# Patient Record
Sex: FEMALE | Race: WHITE | NOT HISPANIC OR LATINO
[De-identification: names, ages, dates, MRNs, and addresses within clinical notes are randomized per-mention and may not be internally consistent; named-entity substitution may affect disease eponyms.]

---

## 2019-11-25 ENCOUNTER — APPOINTMENT (OUTPATIENT)
Dept: OTOLARYNGOLOGY | Facility: CLINIC | Age: 49
End: 2019-11-25
Payer: COMMERCIAL

## 2019-11-25 VITALS
SYSTOLIC BLOOD PRESSURE: 100 MMHG | BODY MASS INDEX: 29.02 KG/M2 | HEART RATE: 79 BPM | DIASTOLIC BLOOD PRESSURE: 69 MMHG | HEIGHT: 64 IN | WEIGHT: 170 LBS

## 2019-11-25 DIAGNOSIS — F17.200 NICOTINE DEPENDENCE, UNSPECIFIED, UNCOMPLICATED: ICD-10-CM

## 2019-11-25 DIAGNOSIS — H93.11 TINNITUS, RIGHT EAR: ICD-10-CM

## 2019-11-25 DIAGNOSIS — H91.91 UNSPECIFIED HEARING LOSS, RIGHT EAR: ICD-10-CM

## 2019-11-25 DIAGNOSIS — H81.03 MENIERE'S DISEASE, BILATERAL: ICD-10-CM

## 2019-11-25 DIAGNOSIS — H90.5 UNSPECIFIED SENSORINEURAL HEARING LOSS: ICD-10-CM

## 2019-11-25 DIAGNOSIS — Z78.9 OTHER SPECIFIED HEALTH STATUS: ICD-10-CM

## 2019-11-25 PROCEDURE — 92584 ELECTROCOCHLEOGRAPHY: CPT

## 2019-11-25 PROCEDURE — 99204 OFFICE O/P NEW MOD 45 MIN: CPT | Mod: 25

## 2019-11-25 PROCEDURE — 92567 TYMPANOMETRY: CPT

## 2019-11-25 PROCEDURE — 92557 COMPREHENSIVE HEARING TEST: CPT

## 2019-11-25 NOTE — REASON FOR VISIT
[Initial Evaluation] : an initial evaluation for [Other: _____] : [unfilled] [FreeTextEntry2] : here for Meniere's disease, right hearing loss, right tinnitus

## 2019-11-25 NOTE — HISTORY OF PRESENT ILLNESS
[de-identified] : 49 year old female here for Menieres disease, right hearing loss, right tinnitus. Wearing right hearing aid.  States diagnosed with Menieres 2015, not sure if it is bilateral or unilateral.  States last episode March 2019, lasting 5 days with nausea and vomiting.  States takes Antivert with some relief.  States right hearing loss diagnosed 2017, constant right tinnitus started 8 years ago.  No family history of hearing loss or Menieres.  Seen in [facility] - watches sodium - offered diuretic if sx got worse - no sig allergies  - no thyroid problems - no AI history in patient or family. Previously saw Joselito Haney. Last has MRI 2017  - normal MRI - done somewhere in [place].

## 2019-12-22 ENCOUNTER — FORM ENCOUNTER (OUTPATIENT)
Age: 49
End: 2019-12-22

## 2019-12-23 ENCOUNTER — APPOINTMENT (OUTPATIENT)
Dept: MRI IMAGING | Facility: IMAGING CENTER | Age: 49
End: 2019-12-23
Payer: COMMERCIAL

## 2019-12-23 ENCOUNTER — OUTPATIENT (OUTPATIENT)
Dept: OUTPATIENT SERVICES | Facility: HOSPITAL | Age: 49
LOS: 1 days | End: 2019-12-23
Payer: COMMERCIAL

## 2019-12-23 DIAGNOSIS — H90.5 UNSPECIFIED SENSORINEURAL HEARING LOSS: ICD-10-CM

## 2019-12-23 PROCEDURE — 70553 MRI BRAIN STEM W/O & W/DYE: CPT | Mod: 26

## 2019-12-23 PROCEDURE — 70553 MRI BRAIN STEM W/O & W/DYE: CPT

## 2019-12-23 PROCEDURE — A9585: CPT

## 2020-01-02 ENCOUNTER — NON-APPOINTMENT (OUTPATIENT)
Age: 50
End: 2020-01-02

## 2020-01-07 ENCOUNTER — TRANSCRIPTION ENCOUNTER (OUTPATIENT)
Age: 50
End: 2020-01-07

## 2020-01-07 ENCOUNTER — INPATIENT (INPATIENT)
Facility: HOSPITAL | Age: 50
LOS: 2 days | Discharge: HOME | End: 2020-01-10
Attending: INTERNAL MEDICINE | Admitting: INTERNAL MEDICINE
Payer: COMMERCIAL

## 2020-01-07 VITALS
RESPIRATION RATE: 18 BRPM | OXYGEN SATURATION: 100 % | DIASTOLIC BLOOD PRESSURE: 81 MMHG | TEMPERATURE: 98 F | HEART RATE: 92 BPM | SYSTOLIC BLOOD PRESSURE: 120 MMHG

## 2020-01-07 LAB
ALBUMIN SERPL ELPH-MCNC: 4.6 G/DL — SIGNIFICANT CHANGE UP (ref 3.3–5)
ALP SERPL-CCNC: 63 U/L — SIGNIFICANT CHANGE UP (ref 40–120)
ALT FLD-CCNC: 18 U/L — SIGNIFICANT CHANGE UP (ref 4–33)
ANION GAP SERPL CALC-SCNC: 16 MMO/L — HIGH (ref 7–14)
AST SERPL-CCNC: 22 U/L — SIGNIFICANT CHANGE UP (ref 4–32)
BASE EXCESS BLDV CALC-SCNC: 2.1 MMOL/L — SIGNIFICANT CHANGE UP
BASOPHILS # BLD AUTO: 0.04 K/UL — SIGNIFICANT CHANGE UP (ref 0–0.2)
BASOPHILS NFR BLD AUTO: 0.5 % — SIGNIFICANT CHANGE UP (ref 0–2)
BILIRUB SERPL-MCNC: 0.4 MG/DL — SIGNIFICANT CHANGE UP (ref 0.2–1.2)
BLOOD GAS VENOUS - CREATININE: 0.75 MG/DL — SIGNIFICANT CHANGE UP (ref 0.5–1.3)
BUN SERPL-MCNC: 9 MG/DL — SIGNIFICANT CHANGE UP (ref 7–23)
CALCIUM SERPL-MCNC: 10.1 MG/DL — SIGNIFICANT CHANGE UP (ref 8.4–10.5)
CHLORIDE BLDV-SCNC: 109 MMOL/L — HIGH (ref 96–108)
CHLORIDE SERPL-SCNC: 102 MMOL/L — SIGNIFICANT CHANGE UP (ref 98–107)
CO2 SERPL-SCNC: 23 MMOL/L — SIGNIFICANT CHANGE UP (ref 22–31)
CREAT SERPL-MCNC: 0.75 MG/DL — SIGNIFICANT CHANGE UP (ref 0.5–1.3)
EOSINOPHIL # BLD AUTO: 0.16 K/UL — SIGNIFICANT CHANGE UP (ref 0–0.5)
EOSINOPHIL NFR BLD AUTO: 1.9 % — SIGNIFICANT CHANGE UP (ref 0–6)
GAS PNL BLDV: 139 MMOL/L — SIGNIFICANT CHANGE UP (ref 136–146)
GLUCOSE BLDV-MCNC: 90 MG/DL — SIGNIFICANT CHANGE UP (ref 70–99)
GLUCOSE SERPL-MCNC: 94 MG/DL — SIGNIFICANT CHANGE UP (ref 70–99)
HCO3 BLDV-SCNC: 24 MMOL/L — SIGNIFICANT CHANGE UP (ref 20–27)
HCT VFR BLD CALC: 40.8 % — SIGNIFICANT CHANGE UP (ref 34.5–45)
HCT VFR BLDV CALC: 42.8 % — SIGNIFICANT CHANGE UP (ref 34.5–45)
HGB BLD-MCNC: 13.4 G/DL — SIGNIFICANT CHANGE UP (ref 11.5–15.5)
HGB BLDV-MCNC: 13.9 G/DL — SIGNIFICANT CHANGE UP (ref 11.5–15.5)
IMM GRANULOCYTES NFR BLD AUTO: 0.2 % — SIGNIFICANT CHANGE UP (ref 0–1.5)
LACTATE BLDV-MCNC: 1.3 MMOL/L — SIGNIFICANT CHANGE UP (ref 0.5–2)
LYMPHOCYTES # BLD AUTO: 1.91 K/UL — SIGNIFICANT CHANGE UP (ref 1–3.3)
LYMPHOCYTES # BLD AUTO: 22.3 % — SIGNIFICANT CHANGE UP (ref 13–44)
MCHC RBC-ENTMCNC: 30.5 PG — SIGNIFICANT CHANGE UP (ref 27–34)
MCHC RBC-ENTMCNC: 32.8 % — SIGNIFICANT CHANGE UP (ref 32–36)
MCV RBC AUTO: 92.7 FL — SIGNIFICANT CHANGE UP (ref 80–100)
MONOCYTES # BLD AUTO: 0.64 K/UL — SIGNIFICANT CHANGE UP (ref 0–0.9)
MONOCYTES NFR BLD AUTO: 7.5 % — SIGNIFICANT CHANGE UP (ref 2–14)
NEUTROPHILS # BLD AUTO: 5.79 K/UL — SIGNIFICANT CHANGE UP (ref 1.8–7.4)
NEUTROPHILS NFR BLD AUTO: 67.6 % — SIGNIFICANT CHANGE UP (ref 43–77)
NRBC # FLD: 0 K/UL — SIGNIFICANT CHANGE UP (ref 0–0)
PCO2 BLDV: 50 MMHG — SIGNIFICANT CHANGE UP (ref 41–51)
PH BLDV: 7.35 PH — SIGNIFICANT CHANGE UP (ref 7.32–7.43)
PLATELET # BLD AUTO: 271 K/UL — SIGNIFICANT CHANGE UP (ref 150–400)
PMV BLD: 9.8 FL — SIGNIFICANT CHANGE UP (ref 7–13)
PO2 BLDV: < 24 MMHG — LOW (ref 35–40)
POTASSIUM BLDV-SCNC: 3.8 MMOL/L — SIGNIFICANT CHANGE UP (ref 3.4–4.5)
POTASSIUM SERPL-MCNC: 4.5 MMOL/L — SIGNIFICANT CHANGE UP (ref 3.5–5.3)
POTASSIUM SERPL-SCNC: 4.5 MMOL/L — SIGNIFICANT CHANGE UP (ref 3.5–5.3)
PROT SERPL-MCNC: 8.5 G/DL — HIGH (ref 6–8.3)
RBC # BLD: 4.4 M/UL — SIGNIFICANT CHANGE UP (ref 3.8–5.2)
RBC # FLD: 11.4 % — SIGNIFICANT CHANGE UP (ref 10.3–14.5)
SAO2 % BLDV: 36.3 % — LOW (ref 60–85)
SODIUM SERPL-SCNC: 141 MMOL/L — SIGNIFICANT CHANGE UP (ref 135–145)
WBC # BLD: 8.56 K/UL — SIGNIFICANT CHANGE UP (ref 3.8–10.5)
WBC # FLD AUTO: 8.56 K/UL — SIGNIFICANT CHANGE UP (ref 3.8–10.5)

## 2020-01-07 PROCEDURE — 70491 CT SOFT TISSUE NECK W/DYE: CPT | Mod: 26

## 2020-01-07 RX ORDER — KETOROLAC TROMETHAMINE 30 MG/ML
15 SYRINGE (ML) INJECTION ONCE
Refills: 0 | Status: DISCONTINUED | OUTPATIENT
Start: 2020-01-07 | End: 2020-01-07

## 2020-01-07 RX ORDER — DEXAMETHASONE 0.5 MG/5ML
10 ELIXIR ORAL ONCE
Refills: 0 | Status: DISCONTINUED | OUTPATIENT
Start: 2020-01-07 | End: 2020-01-07

## 2020-01-07 RX ORDER — SODIUM CHLORIDE 9 MG/ML
1000 INJECTION INTRAMUSCULAR; INTRAVENOUS; SUBCUTANEOUS ONCE
Refills: 0 | Status: COMPLETED | OUTPATIENT
Start: 2020-01-07 | End: 2020-01-07

## 2020-01-07 RX ORDER — AMPICILLIN SODIUM AND SULBACTAM SODIUM 250; 125 MG/ML; MG/ML
3 INJECTION, POWDER, FOR SUSPENSION INTRAMUSCULAR; INTRAVENOUS ONCE
Refills: 0 | Status: DISCONTINUED | OUTPATIENT
Start: 2020-01-07 | End: 2020-01-07

## 2020-01-07 RX ORDER — MEROPENEM 1 G/30ML
1000 INJECTION INTRAVENOUS ONCE
Refills: 0 | Status: COMPLETED | OUTPATIENT
Start: 2020-01-07 | End: 2020-01-07

## 2020-01-07 RX ORDER — DEXAMETHASONE 0.5 MG/5ML
10 ELIXIR ORAL ONCE
Refills: 0 | Status: COMPLETED | OUTPATIENT
Start: 2020-01-07 | End: 2020-01-07

## 2020-01-07 RX ADMIN — Medication 15 MILLIGRAM(S): at 18:00

## 2020-01-07 RX ADMIN — Medication 15 MILLIGRAM(S): at 17:44

## 2020-01-07 RX ADMIN — Medication 15 MILLIGRAM(S): at 23:19

## 2020-01-07 RX ADMIN — SODIUM CHLORIDE 1000 MILLILITER(S): 9 INJECTION INTRAMUSCULAR; INTRAVENOUS; SUBCUTANEOUS at 22:52

## 2020-01-07 RX ADMIN — MEROPENEM 100 MILLIGRAM(S): 1 INJECTION INTRAVENOUS at 23:19

## 2020-01-07 RX ADMIN — Medication 15 MILLIGRAM(S): at 22:33

## 2020-01-07 RX ADMIN — Medication 102 MILLIGRAM(S): at 22:32

## 2020-01-07 RX ADMIN — Medication 100 MILLIGRAM(S): at 18:04

## 2020-01-07 NOTE — ED PROVIDER NOTE - ATTENDING CONTRIBUTION TO CARE
Lloyd: 49 yof with root canal 4 days ago with notable increased swelling and pain since. Pt saw her oral surgeon who recommended ED. Pt is having difficulty opening her mouth but no difficulty breathing or walking. Pt hs been taking Motrin around the clock. On exam, pt is comfortable, no respiratory distress, clear lungs, RRR, abd soft and non tender. Lloyd: 49 yof with root canal 4 days ago with notable increased swelling and pain since. Pt saw her oral surgeon who recommended ED. Pt is having difficulty opening her mouth but no difficulty breathing or walking. Pt hs been taking Motrin around the clock. On exam, pt is comfortable, no respiratory distress, clear lungs, RRR, abd soft and non tender. significant submandibular edema with firm mass on left side with no overlying erythema. Concern for abscess - no airway concern at this time but if edema gets worse it may be a problem. pian meds, labs and CT ordered. Unlikely Peter's at this time.

## 2020-01-07 NOTE — ED PROVIDER NOTE - CLINICAL SUMMARY MEDICAL DECISION MAKING FREE TEXT BOX
left neck and submandibular swelling s/p root canal 5 days ago. mild trismus, no fever or stridor and able to tolerate po and secretion. no active concern for air at this time, concerning for possible erick, will ct neck and labs, abx, pain control.

## 2020-01-07 NOTE — ED PROVIDER NOTE - PROGRESS NOTE DETAILS
Geovany Campo, PGY 2: signout to PA. ZULY UMANA: OMFS consulted. started Unasyn. CT shows small abscess in sublingual space. Will continue to monitor and reassess. ZULY UMANA: OMFS consulted. started meropenam. CT shows small abscess in sublingual space. Will continue to monitor and reassess. ovidio-patient s/o to me by dr. dale-f/u ct results-ct reviewed, omfs consulted-patient switched to meropenam given pcn allergy (rash) and has been on clinda for days without improvement, recommending admission for iv abx, will follow patient. Patient had some relief of pain, returned and redosed pain meds with improvement of pain. Patient protecting airway, no drooling/stridor, able to swallow, speaking clearly. ZULY UMANA: Pt seen by OMFS, agrees with IV abx, plan for I&D once pt is able to open up more tomorrow. Spoke with hospitalist, accepted pt. Pt admitted for sublingual abscess. MAR text paged.

## 2020-01-07 NOTE — ED PROVIDER NOTE - OBJECTIVE STATEMENT
49F, no pmh, p.w left neck swelling and tooth ache s/p root canal 5 days ago. pain is worsening today. Pt went to see her dentist and oral-surgery who referred her to the ED. Pt denied any fever but is taking motrin around the clock for pain control. Denied any shortness of breath, difficulty breathing, numbness of the face or the neck. No immunocompromised. allergy to penicillin (rash) and on clindamycin.

## 2020-01-07 NOTE — ED PROVIDER NOTE - PHYSICAL EXAMINATION
General: NAD, good hygiene, well developed  HENT: Atraumatic, EOMI, Left sided neck swelling with tenderness to the submandibular area, no tongue elevation, mild trismus of 4cm, no stridor and able to hold secretions, no conjunctivae injection, moist mucosa, no post. oropharynx erythema, exudates  Neck: normal ROM and trachea midline   Cardiovascular: RRR, S1&2, no M or R, radial pulses equal and b/l  Respiratory: CTABL, no wheezes or crackles, no decreased breath sounds  Abdominal: soft and non-tender non distended, neg for guarding  Extremities: no edema of the legs/feet   Skin: warm, well perfused  Neurologic: nonfocal, AAOx3  Psych: normal mood and affect

## 2020-01-07 NOTE — ED ADULT TRIAGE NOTE - CHIEF COMPLAINT QUOTE
Pt had lower left root canal on Friday. States she went back to endontist who did root canal because she was having pain and swelling to left lower jaw. Endontist sent patient to oral surgeon. Oral surgeon told patient she needs to go to ED because infection in jaw is deep. Pt had lower left root canal on Friday. States she went back to endontist who did root canal because she was having pain and swelling to left lower jaw. Endontist sent patient to oral surgeon. Oral surgeon told patient she needs to go to ED because infection in jaw is deep. Deep difficulty breathing.

## 2020-01-08 DIAGNOSIS — K12.2 CELLULITIS AND ABSCESS OF MOUTH: ICD-10-CM

## 2020-01-08 DIAGNOSIS — Z02.9 ENCOUNTER FOR ADMINISTRATIVE EXAMINATIONS, UNSPECIFIED: ICD-10-CM

## 2020-01-08 DIAGNOSIS — Z29.9 ENCOUNTER FOR PROPHYLACTIC MEASURES, UNSPECIFIED: ICD-10-CM

## 2020-01-08 LAB
ANION GAP SERPL CALC-SCNC: 12 MMO/L — SIGNIFICANT CHANGE UP (ref 7–14)
APTT BLD: 31.7 SEC — SIGNIFICANT CHANGE UP (ref 27.5–36.3)
BUN SERPL-MCNC: 15 MG/DL — SIGNIFICANT CHANGE UP (ref 7–23)
CALCIUM SERPL-MCNC: 9.2 MG/DL — SIGNIFICANT CHANGE UP (ref 8.4–10.5)
CHLORIDE SERPL-SCNC: 107 MMOL/L — SIGNIFICANT CHANGE UP (ref 98–107)
CO2 SERPL-SCNC: 20 MMOL/L — LOW (ref 22–31)
CREAT SERPL-MCNC: 0.58 MG/DL — SIGNIFICANT CHANGE UP (ref 0.5–1.3)
GLUCOSE SERPL-MCNC: 102 MG/DL — HIGH (ref 70–99)
GRAM STN WND: SIGNIFICANT CHANGE UP
HCG SERPL-ACNC: < 5 MIU/ML — SIGNIFICANT CHANGE UP
HCT VFR BLD CALC: 36 % — SIGNIFICANT CHANGE UP (ref 34.5–45)
HGB BLD-MCNC: 12.3 G/DL — SIGNIFICANT CHANGE UP (ref 11.5–15.5)
INR BLD: 1.18 — HIGH (ref 0.88–1.17)
MAGNESIUM SERPL-MCNC: 2.1 MG/DL — SIGNIFICANT CHANGE UP (ref 1.6–2.6)
MCHC RBC-ENTMCNC: 30.7 PG — SIGNIFICANT CHANGE UP (ref 27–34)
MCHC RBC-ENTMCNC: 34.2 % — SIGNIFICANT CHANGE UP (ref 32–36)
MCV RBC AUTO: 89.8 FL — SIGNIFICANT CHANGE UP (ref 80–100)
NRBC # FLD: 0 K/UL — SIGNIFICANT CHANGE UP (ref 0–0)
PHOSPHATE SERPL-MCNC: 4.5 MG/DL — SIGNIFICANT CHANGE UP (ref 2.5–4.5)
PLATELET # BLD AUTO: 239 K/UL — SIGNIFICANT CHANGE UP (ref 150–400)
PMV BLD: 9.7 FL — SIGNIFICANT CHANGE UP (ref 7–13)
POTASSIUM SERPL-MCNC: 4.1 MMOL/L — SIGNIFICANT CHANGE UP (ref 3.5–5.3)
POTASSIUM SERPL-SCNC: 4.1 MMOL/L — SIGNIFICANT CHANGE UP (ref 3.5–5.3)
PROTHROM AB SERPL-ACNC: 13.2 SEC — HIGH (ref 9.8–13.1)
RBC # BLD: 4.01 M/UL — SIGNIFICANT CHANGE UP (ref 3.8–5.2)
RBC # FLD: 11 % — SIGNIFICANT CHANGE UP (ref 10.3–14.5)
SODIUM SERPL-SCNC: 139 MMOL/L — SIGNIFICANT CHANGE UP (ref 135–145)
SPECIMEN SOURCE: SIGNIFICANT CHANGE UP
WBC # BLD: 7.85 K/UL — SIGNIFICANT CHANGE UP (ref 3.8–10.5)
WBC # FLD AUTO: 7.85 K/UL — SIGNIFICANT CHANGE UP (ref 3.8–10.5)

## 2020-01-08 PROCEDURE — 93010 ELECTROCARDIOGRAM REPORT: CPT

## 2020-01-08 PROCEDURE — 99223 1ST HOSP IP/OBS HIGH 75: CPT | Mod: GC

## 2020-01-08 PROCEDURE — 71045 X-RAY EXAM CHEST 1 VIEW: CPT | Mod: 26

## 2020-01-08 RX ORDER — MEROPENEM 1 G/30ML
INJECTION INTRAVENOUS
Refills: 0 | Status: DISCONTINUED | OUTPATIENT
Start: 2020-01-08 | End: 2020-01-10

## 2020-01-08 RX ORDER — ACETAMINOPHEN 500 MG
650 TABLET ORAL EVERY 4 HOURS
Refills: 0 | Status: DISCONTINUED | OUTPATIENT
Start: 2020-01-08 | End: 2020-01-10

## 2020-01-08 RX ORDER — SODIUM CHLORIDE 9 MG/ML
1000 INJECTION INTRAMUSCULAR; INTRAVENOUS; SUBCUTANEOUS
Refills: 0 | Status: DISCONTINUED | OUTPATIENT
Start: 2020-01-08 | End: 2020-01-09

## 2020-01-08 RX ORDER — MEROPENEM 1 G/30ML
1000 INJECTION INTRAVENOUS EVERY 8 HOURS
Refills: 0 | Status: DISCONTINUED | OUTPATIENT
Start: 2020-01-08 | End: 2020-01-10

## 2020-01-08 RX ORDER — MEROPENEM 1 G/30ML
1000 INJECTION INTRAVENOUS ONCE
Refills: 0 | Status: COMPLETED | OUTPATIENT
Start: 2020-01-08 | End: 2020-01-08

## 2020-01-08 RX ADMIN — Medication 650 MILLIGRAM(S): at 10:48

## 2020-01-08 RX ADMIN — Medication 650 MILLIGRAM(S): at 21:32

## 2020-01-08 RX ADMIN — Medication 650 MILLIGRAM(S): at 11:20

## 2020-01-08 RX ADMIN — SODIUM CHLORIDE 100 MILLILITER(S): 9 INJECTION INTRAMUSCULAR; INTRAVENOUS; SUBCUTANEOUS at 20:36

## 2020-01-08 RX ADMIN — Medication 650 MILLIGRAM(S): at 22:32

## 2020-01-08 RX ADMIN — MEROPENEM 100 MILLIGRAM(S): 1 INJECTION INTRAVENOUS at 09:34

## 2020-01-08 NOTE — H&P ADULT - PROBLEM SELECTOR PLAN 3
Transitions of Care Status:  1.  Name of PCP: None  2.  PCP Contacted on Admission: [ ] Y    [X] N    3.  PCP contacted at Discharge: [ ] Y    [ ] N    [ ] N/A  4.  Post-Discharge Appointment Date and Location:  5.  Summary of Handoff given to PCP:

## 2020-01-08 NOTE — H&P ADULT - PROBLEM SELECTOR PLAN 2
DVT Ppx: Improve score 0  Diet: NPO for planned procedure   Dispo: Pending medical improvement DVT Ppx: Improve score 0  Diet: NPO for planned procedure   Dispo: Pending clinical improvement

## 2020-01-08 NOTE — ED ADULT NURSE NOTE - CHIEF COMPLAINT QUOTE
Pt had lower left root canal on Friday. States she went back to endontist who did root canal because she was having pain and swelling to left lower jaw. Endontist sent patient to oral surgeon. Oral surgeon told patient she needs to go to ED because infection in jaw is deep. Deep difficulty breathing.

## 2020-01-08 NOTE — H&P ADULT - NSHPLABSRESULTS_GEN_ALL_CORE
LABS:                  13.4   8.56  )-----------( 271      ( 07 Jan 2020 17:08 )             40.8     141  |  102  |  9   ----------------------------<  94  4.5   |  23  |  0.75    Ca    10.1      07 Jan 2020 17:08    TPro  8.5<H>  /  Alb  4.6  /  TBili  0.4  /  DBili  x   /  AST  22  /  ALT  18  /  AlkPhos  63  01-07    LIVER FUNCTIONS - ( 07 Jan 2020 17:08 )  Alb: 4.6 g/dL / Pro: 8.5 g/dL / ALK PHOS: 63 u/L / ALT: 18 u/L / AST: 22 u/L / GGT: x    CT Neck Soft Tissue w/ IV Cont (01.07.20 @ 20:10)  IMPRESSION:     Periapical lucency involving the left mandibular second molar tooth with dehiscence of the lingual cortex. There is an adjacent 0.5 x 1.5 x 1.2 cm abscess within the sublingual space.\      MR Head w/wo IV Cont (12.23.19 @ 09:27)  IMPRESSION:    No acute intracranial hemorrhage, mass effect, vasogenic edema, or evidence of acute territorial infarct.  No abnormal parenchymal or leptomeningeal enhancement.  No abnormal enhancement in the IACs or cerebellopontine angles. No cerebellopontine angle mass. LABS:                           12.3   7.85  )-----------( 239      ( 08 Jan 2020 12:10 )             36.0     01-08    139  |  107  |  15  ----------------------------<  102<H>  4.1   |  20<L>  |  0.58    Ca    9.2      08 Jan 2020 12:10  Phos  4.5     01-08  Mg     2.1     01-08    TPro  8.5<H>  /  Alb  4.6  /  TBili  0.4  /  DBili  x   /  AST  22  /  ALT  18  /  AlkPhos  63  01-07    LIVER FUNCTIONS - ( 07 Jan 2020 17:08 )  Alb: 4.6 g/dL / Pro: 8.5 g/dL / ALK PHOS: 63 u/L / ALT: 18 u/L / AST: 22 u/L / GGT: x           PT/INR - ( 08 Jan 2020 12:10 )   PT: 13.2 SEC;   INR: 1.18     PTT - ( 08 Jan 2020 12:10 )  PTT:31.7 SEC      CT Neck Soft Tissue w/ IV Cont (01.07.20 @ 20:10)  IMPRESSION:     Periapical lucency involving the left mandibular second molar tooth with dehiscence of the lingual cortex. There is an adjacent 0.5 x 1.5 x 1.2 cm abscess within the sublingual space.\      MR Head w/wo IV Cont (12.23.19 @ 09:27)  IMPRESSION:    No acute intracranial hemorrhage, mass effect, vasogenic edema, or evidence of acute territorial infarct.  No abnormal parenchymal or leptomeningeal enhancement.  No abnormal enhancement in the IACs or cerebellopontine angles. No cerebellopontine angle mass.

## 2020-01-08 NOTE — H&P ADULT - ASSESSMENT
48 yo F w/ no significant PMH presenting w/ L mandibular pain and swelling in the setting of a necrotic Tooth #18 with associated Left sublingual space abscess noted on imaging.

## 2020-01-08 NOTE — CONSULT NOTE ADULT - SUBJECTIVE AND OBJECTIVE BOX
Patient is a 49 year old female with no PMHx who presents with left submandibular swelling. Patient reports that 5 days prior, she had a root canal started at her left molar which became ......         49F, no pmh, p.w left neck swelling and tooth ache s/p root canal 5 days ago. pain is worsening today. Pt went to see her dentist and oral-surgery who referred her to the ED. Pt denied any fever but is taking motrin around the clock for pain control. Denied any shortness of breath, difficulty breathing, numbness of the face or the neck. No immunocompromised. allergy to penicillin (rash) and on clindamycin.    PAST MEDICAL & SURGICAL HISTORY:    MEDICATIONS  (STANDING):    MEDICATIONS  (PRN):    Allergies    penicillin (Unknown)    Intolerances      FAMILY HISTORY:    *SOCIAL HISTORY: Denies ETOH use, Tobacco, drugs    * Review of Systems: <<Denies>> fever, chills. <<Denies>> LOC. <<Denies>> Nausea/vomiting/headache. <<Denies>> CP, SOB, cough. <<Denies>> palpitations. <<Denies>> blurred vision/double vision. <<Denies>> dysphagia, dyspnea. <<Denies>> paresthesia.     Vital Signs Last 24 Hrs  T(C): 36.9 (07 Jan 2020 15:40), Max: 36.9 (07 Jan 2020 15:40)  T(F): 98.5 (07 Jan 2020 15:40), Max: 98.5 (07 Jan 2020 15:40)  HR: 92 (07 Jan 2020 15:40) (92 - 92)  BP: 120/81 (07 Jan 2020 15:40) (120/81 - 120/81)  BP(mean): --  RR: 18 (07 Jan 2020 15:40) (18 - 18)  SpO2: 100% (07 Jan 2020 15:40) (100% - 100%)    Physical Exam:  Gen: AAox3, NAD, NC/AT  Head: (   ) Lacerations/abrasions/hematomas. (   ) edema/erythema/tenderness to palpation. (   ) asymmetry. (   ) signs of scalp infection  Eyes: EOMI, PERRL, visual acuity <<intact>>, (   ) diplopia,  (   ) supra/infra orbital rims intact, (   ) subconjunctival heme, (   ) telecanthus, (   ) exophthalmos  Ears: Gross hearing <<intact>>, <<No>> heme appreciated, Condylar head palpated  Nose: (   )crepitis/septal hematoma/asymmetry.  (   ) Lacerations/abrasions/hematomas.  Malar: (   ) malar depression, (   ) CN V-2 paresthesia  Throat: (   ) LAD, supple, FROM, (   ) lesions  Extraoral/Intraoral Exam: SOLOMON: (   ) , Dentition grossly intact, (   ) carious dentition, (   ) occlusion stable and reproducible, (   ) lacerations/abrasions/hematomas, (   ) mandibular step deformity, (   ) CN V-3 paresthesia, (   ) signs of infection, (   ) edema/erythema/tenderness to palpation. FOM soft, NT. (   ) mobility of maxilla/crepitis. TMJ: (   ) clicking/popping  CN II-XII intact    LABS:                        13.4   8.56  )-----------( 271      ( 07 Jan 2020 17:08 )             40.8     01-07    141  |  102  |  9   ----------------------------<  94  4.5   |  23  |  0.75    Ca    10.1      07 Jan 2020 17:08    TPro  8.5<H>  /  Alb  4.6  /  TBili  0.4  /  DBili  x   /  AST  22  /  ALT  18  /  AlkPhos  63  01-07              CT Maxillofacial:     Assessment/Plan: 49yFemale  - No Acute OMFS intervention at this time. Follow up in OMFS clinic in 1 week. Call [phone] to schedule an appointment.  - Rec Abx  - Rec Pain Control  - Rec Peridex rinse BID x 2 weeks  - No pressure to face, ice to face  - Rec soft diet  - Rec Sinus precautions (no nose blowing, no sucking on straws, sneeze with mouth open)  Case discussed with attending. Patient is a 49 year old female with no PMHx who presents with left submandibular swelling. Patient reports that her initial symptoms started 2 months prior when she experienced some gum pain. About a week ago, she began to feel discomfort at the lower left area and noticed a small area of swelling. She went to her dentist last Friday who referred her to an endodontist (Dr. Boo Chicas) and root canal therapy was initiated. Patient was prescribed Clindamycin last Friday. This morning, she woke up with increased swelling and reported back to her endodontist, who referred her to an oral surgeon  ([facility]) who referred her to the ED for evaluation.   Patient now reports firm neck swelling and constant throbbing at lower left molar (tooth #18). Patient managing pain with Motrin, reports occasional pain to 8/10.  Patient examined in ED. Patient resting comfortably/lying down on back. Patient denies fever/chills, dysphagia, dyspnea.       PAST MEDICAL & SURGICAL HISTORY: None     MEDICATIONS  (STANDING):  MEDICATIONS  (PRN):    Allergies: penicillin (Unknown)    Intolerances    *SOCIAL HISTORY: Denies ETOH use, Tobacco, drugs    Vital Signs Last 24 Hrs  T(C): 36.9 (07 Jan 2020 15:40), Max: 36.9 (07 Jan 2020 15:40)  T(F): 98.5 (07 Jan 2020 15:40), Max: 98.5 (07 Jan 2020 15:40)  HR: 92 (07 Jan 2020 15:40) (92 - 92)  BP: 120/81 (07 Jan 2020 15:40) (120/81 - 120/81)  BP(mean): --  RR: 18 (07 Jan 2020 15:40) (18 - 18)  SpO2: 100% (07 Jan 2020 15:40) (100% - 100%)      Physical Exam:  Gen: AAox3, NAD, NC/AT, lying comfortably.   Extraoral: Left neck - firm, palpable swelling, pain on palpation. Swelling is overlying left body/inferior border of mandible and palpable above and below border. No erythema of overlying skin.   Intraoral Exam: SOLOMON: ( ~15-20mm  ), Trismus noted. Tooth #18 with temporary filling on occlusal surface. No intraoral swelling/fluctuance noted. No pain on palpation/tenderness of lingual and buccal aspects of posterior left quadrant.   Occlusion stable and reproducible, FOM soft, NT.    CBC Full  -  ( 07 Jan 2020 17:08 )  WBC Count : 8.56 K/uL  RBC Count : 4.40 M/uL  Hemoglobin : 13.4 g/dL  Hematocrit : 40.8 %  Platelet Count - Automated : 271 K/uL  Mean Cell Volume : 92.7 fL  Mean Cell Hemoglobin : 30.5 pg  Mean Cell Hemoglobin Concentration : 32.8 %  Auto Neutrophil # : 5.79 K/uL  Auto Lymphocyte # : 1.91 K/uL  Auto Monocyte # : 0.64 K/uL  Auto Eosinophil # : 0.16 K/uL  Auto Basophil # : 0.04 K/uL  Auto Neutrophil % : 67.6 %  Auto Lymphocyte % : 22.3 %  Auto Monocyte % : 7.5 %  Auto Eosinophil % : 1.9 %  Auto Basophil % : 0.5 %          CT Maxillofacial:   EXAM: CT NECK SOFT TISSUE IC   PROCEDURE DATE: Jan 7 2020   INTERPRETATION: HISTORY: Left neck swelling status post root canal,   evaluate for Peter angina.     CT SCAN OF THE NECK WITH CONTRAST:     TECHNIQUE: Axial sections were obtained from the skull base to the sternum   after the administration of intravenous contrast. Coronal and sagittal   reformatted images were subsequently obtained. 90 mL of Omnipaque 350 was   administered intravenously. 10 mL was discarded.     COMPARISON: None.     FINDINGS:   Periapical lucency involving the left mandibular second molar tooth with   dehiscence of the lingual cortex. There is an adjacent 0.5 x 1.5 x 1.2 cm   abscess within the sublingual space.     The nasopharynx and oropharynx. The hypopharynx, larynx, and trachea are   within normal limits.     The parotid and submandibular glands are unremarkable.     The thyroid gland is within normal limits.     The visualized intracranial structures are within normal limits. Orbital   contents are unremarkable. Paranasal sinuses are clear.     The visualized lung apices are clear.     IMPRESSION:   Periapical lucency involving the left mandibular second molar tooth with   dehiscence of the lingual cortex. There is an adjacent 0.5 x 1.5 x 1.2 cm   abscess within the sublingual space.       Panoramic Radiograph   Adult dentition, Tooth #18, Posterior Left 2nd molar with Periapical Radiolucency. B/L Condyles fully seated in respective fossae, maxillary sinuses clear b/l.         Assessment/Plan: 49y Female with Necrotic Tooth #18 with associated Left sublingual/submandibular space abscess.     - Rec IV Abx  - Rec Pain control prn   - [facility] [place] [facility] to re-evaluate patient in AM, possible I&D and Extraction of tooth #18 in clinic during regular hours.   - Rec soft diet      Case discussed with attending and chief resident.     DHRUV Gardner   [facility] [place] [facility] [id number] Patient is a 49 year old female with no PMHx who presents with left submandibular swelling. Patient reports that her initial symptoms started 2 months prior when she experienced some gum pain. About a week ago, she began to feel discomfort at the lower left area and noticed a small area of swelling. She went to her dentist last Friday who referred her to an endodontist (Dr. Boo Chicas) and root canal therapy was initiated. Patient was prescribed Clindamycin last Friday. This morning, she woke up with increased swelling and reported back to her endodontist, who referred her to an oral surgeon  ([facility]) who referred her to the ED for evaluation.   Patient now reports firm neck swelling and constant throbbing at lower left molar (tooth #18). Patient managing pain with Motrin, reports occasional pain to 8/10.  Patient examined in ED. Patient resting comfortably/lying down on back. Patient denies fever/chills, dysphagia, dyspnea.       PAST MEDICAL & SURGICAL HISTORY: None     MEDICATIONS  (STANDING):  MEDICATIONS  (PRN):    Allergies: penicillin (Unknown)    Intolerances    *SOCIAL HISTORY: Denies ETOH use, Tobacco, drugs    Vital Signs Last 24 Hrs  T(C): 36.9 (07 Jan 2020 15:40), Max: 36.9 (07 Jan 2020 15:40)  T(F): 98.5 (07 Jan 2020 15:40), Max: 98.5 (07 Jan 2020 15:40)  HR: 92 (07 Jan 2020 15:40) (92 - 92)  BP: 120/81 (07 Jan 2020 15:40) (120/81 - 120/81)  BP(mean): --  RR: 18 (07 Jan 2020 15:40) (18 - 18)  SpO2: 100% (07 Jan 2020 15:40) (100% - 100%)      Physical Exam:  Gen: AAox3, NAD, NC/AT, lying comfortably.   Extraoral: Left neck - firm, palpable swelling, pain on palpation. Swelling is overlying left body/inferior border of mandible and palpable above and below border. No erythema of overlying skin.   Intraoral Exam: SOLOMON: ( ~15-20mm  ), Trismus noted. Tooth #18 with temporary filling on occlusal surface. No intraoral swelling/fluctuance noted. No pain on palpation/tenderness of lingual and buccal aspects of posterior left quadrant.   Occlusion stable and reproducible, FOM soft, NT.    CBC Full  -  ( 07 Jan 2020 17:08 )  WBC Count : 8.56 K/uL  RBC Count : 4.40 M/uL  Hemoglobin : 13.4 g/dL  Hematocrit : 40.8 %  Platelet Count - Automated : 271 K/uL  Mean Cell Volume : 92.7 fL  Mean Cell Hemoglobin : 30.5 pg  Mean Cell Hemoglobin Concentration : 32.8 %  Auto Neutrophil # : 5.79 K/uL  Auto Lymphocyte # : 1.91 K/uL  Auto Monocyte # : 0.64 K/uL  Auto Eosinophil # : 0.16 K/uL  Auto Basophil # : 0.04 K/uL  Auto Neutrophil % : 67.6 %  Auto Lymphocyte % : 22.3 %  Auto Monocyte % : 7.5 %  Auto Eosinophil % : 1.9 %  Auto Basophil % : 0.5 %          CT Maxillofacial:   EXAM: CT NECK SOFT TISSUE IC   PROCEDURE DATE: Jan 7 2020   INTERPRETATION: HISTORY: Left neck swelling status post root canal,   evaluate for Peter angina.     CT SCAN OF THE NECK WITH CONTRAST:     TECHNIQUE: Axial sections were obtained from the skull base to the sternum   after the administration of intravenous contrast. Coronal and sagittal   reformatted images were subsequently obtained. 90 mL of Omnipaque 350 was   administered intravenously. 10 mL was discarded.     COMPARISON: None.     FINDINGS:   Periapical lucency involving the left mandibular second molar tooth with   dehiscence of the lingual cortex. There is an adjacent 0.5 x 1.5 x 1.2 cm   abscess within the sublingual space.     The nasopharynx and oropharynx. The hypopharynx, larynx, and trachea are   within normal limits.     The parotid and submandibular glands are unremarkable.     The thyroid gland is within normal limits.     The visualized intracranial structures are within normal limits. Orbital   contents are unremarkable. Paranasal sinuses are clear.     The visualized lung apices are clear.     IMPRESSION:   Periapical lucency involving the left mandibular second molar tooth with   dehiscence of the lingual cortex. There is an adjacent 0.5 x 1.5 x 1.2 cm   abscess within the sublingual space.       Panoramic Radiograph   Adult dentition, Tooth #18, Posterior Left 2nd molar with Periapical Radiolucency. B/L Condyles fully seated in respective fossae, maxillary sinuses clear b/l.         Assessment/Plan: 49y Female with Necrotic Tooth #18 with associated Left submandibular space abscess.     - Rec IV Abx  - Rec Pain control prn   - OMFS to re-evaluate patient in AM, possible I&D and Extraction of tooth #18 in clinic during regular hours.   - Rec soft diet      Case discussed with attending and chief resident.     DHRUV Gardner   [facility] [id number]

## 2020-01-08 NOTE — H&P ADULT - NSHPPHYSICALEXAM_GEN_ALL_CORE
VITALS:   Vital Signs Last 24 Hrs  T(C): 36.7 (08 Jan 2020 07:50), Max: 37.1 (08 Jan 2020 01:44)  T(F): 98 (08 Jan 2020 07:50), Max: 98.8 (08 Jan 2020 01:44)  HR: 84 (08 Jan 2020 07:50) (76 - 92)  BP: 113/66 (08 Jan 2020 07:50) (101/61 - 134/86)  BP(mean): --  RR: 18 (08 Jan 2020 07:50) (16 - 18)  SpO2: 97% (08 Jan 2020 07:50) (97% - 100%)    GENERAL: NAD, lying in bed comfortably  HEAD:  Atraumatic, Normocephalic  EYES: EOMI, PERRLA, conjunctiva and sclera clear  ENT: Moist mucous membranes w/ filling in Tooth #18  NECK: Firm and TTP swelling over L mandible. No erythema, warmth, or lesions otherwise noted  CHEST/LUNG: Clear to auscultation bilaterally; No rales, rhonchi, wheezing, or rubs. Unlabored respirations  HEART: Regular rate and rhythm; No murmurs, rubs, or gallops  ABDOMEN: Bowel sounds present; Soft, Nontender, Nondistended. No hepatomegaly  EXTREMITIES:  2+ Peripheral Pulses, brisk capillary refill. No clubbing, cyanosis, or edema  NERVOUS SYSTEM:  Alert & Oriented X3, speech clear. No deficits   MSK: FROM all 4 extremities, full and equal strength  SKIN: No rashes or lesions

## 2020-01-08 NOTE — H&P ADULT - HISTORY OF PRESENT ILLNESS
Pt is a 48 yo F w/ no significant PMH presenting w/ left submandibular swelling. Patient reports that her initial symptoms started 2 months prior when she experienced some gum pain and underwent a course of Z-janet. About a week ago, she began to feel discomfort at the lower left area and noticed a small area of swelling and thus went to the dentist last Friday who referred her to an endodontist (Dr. Boo Chicas) and root canal therapy was initiated and she was prescribed Clindamycin. On day of admission pt woke up with increased swelling and reported back to her endodontist, who referred her to an oral surgeon ([facility]) who subsequently referred her to the ED for further evaluation. She now reports firm neck swelling and constant throbbing at lower left molar (tooth #18). Patient managing pain with Motrin, reports occasional pain to 8/10. She denies fever/chills, dysphagia, dyspnea, TMJ tenderness, difficulty opening mouth.    ED Course: T: 36.9, HR 92, /81. CBC unremarkable. CT Maxillofacial significant for periapical lucency involving the left mandibular second molar tooth with dehiscence of the lingual cortex. There is an adjacent 0.5 x 1.5 x 1.2 cm abscess within the sublingual space. Pt is a 50 yo F w/ no significant PMH presenting w/ left submandibular swelling. Patient reports that her initial symptoms started 2 months prior when she experienced some gum pain and underwent a course of Z-janet. About a week ago, she began to feel discomfort at the lower left area and noticed a small area of swelling and thus went to the dentist last Friday who referred her to an endodontist (Dr. Boo Chicas) and root canal therapy was initiated and she was prescribed Clindamycin. On day of admission pt woke up with increased swelling and reported back to her endodontist, who referred her to an oral surgeon ([facility]) who subsequently referred her to the ED for further evaluation. She now reports firm neck swelling and constant throbbing at lower left molar (tooth #18). Patient managing pain with Motrin, reports occasional pain to 8/10. She denies fever/chills, dysphagia, dyspnea, TMJ tenderness, difficulty opening mouth.    ED Course: T: 36.9, HR 92, /81. CBC unremarkable. CT Maxillofacial significant for periapical lucency involving the left mandibular second molar tooth with dehiscence of the lingual cortex. There is an adjacent 0.5 x 1.5 x 1.2 cm abscess within the sublingual space.     Interval Hx: Pt laying comfortably in bed, states pain this AM is much improved w/ continued swelling in the submandibular region. Denies any dysphagia, dyspnea, SOB. Pt is a 50 yo F w/ no significant PMH presenting w/ left submandibular swelling. Patient reports that her initial symptoms started 2 months prior when she experienced some gum pain and underwent a course of Z-janet. About a week ago, she began to feel discomfort at the lower left area and noticed a small area of swelling and thus went to the dentist last Friday who referred her to an endodontist (Dr. Boo Chicas) and root canal therapy was initiated and she was prescribed Clindamycin. On day of admission pt woke up with increased swelling and reported back to her endodontist, who referred her to an oral surgeon ([facility]) who subsequently referred her to the ED for further evaluation. She now reports firm neck swelling and constant throbbing at lower left molar (tooth #18) w/ some difficulty opening mouth. Patient managing pain with Motrin, reports occasional pain to 8/10. She denies fever/chills, dysphagia, dyspnea, TMJ tenderness,     ED Course: T: 36.9, HR 92, /81. CBC unremarkable. CT Maxillofacial significant for periapical lucency involving the left mandibular second molar tooth with dehiscence of the lingual cortex. There is an adjacent 0.5 x 1.5 x 1.2 cm abscess within the sublingual space. Received Toradol 15mg for pain control      Interval Hx: Pt laying comfortably in bed, states pain this AM is much improved w/ continued swelling in the submandibular region. Denies any dysphagia, dyspnea, SOB.

## 2020-01-08 NOTE — H&P ADULT - ATTENDING COMMENTS
I have personally seen, examined, and participated in the care of this patient, I have reviewed all pertinent clinical information, including history, physical exam, plan, and the above resident's note. The case and plan have been discussed with resident, above note edited where appropriate.   Mis Guaman MD

## 2020-01-08 NOTE — PROGRESS NOTE ADULT - SUBJECTIVE AND OBJECTIVE BOX
HD1: 49 year old female with submandibular swelling associated with Tooth #18. Patient examined at admitting floor bed, patient resting comfortably. Patient subjectively reports feeling better. VSSAF. Patient denies fever/chills, dysphagia, dyspnea.   Allergic: Penicillin     ------------------------  Patient is a 49 year old female with no PMHx who presents with left submandibular swelling. Patient reports that her initial symptoms started 2 months prior when she experienced some gum pain. About a week ago, she began to feel discomfort at the lower left area and noticed a small area of swelling. She went to her dentist last Friday who referred her to an endodontist (Dr. Boo Chicas) and root canal therapy was initiated. Patient was prescribed Clindamycin last Friday. This morning, she woke up with increased swelling and reported back to her endodontist, who referred her to an oral surgeon  ([facility]) who referred her to the ED for evaluation.   Patient now reports firm neck swelling and constant throbbing at lower left molar (tooth #18). Patient managing pain with Motrin, reports occasional pain to 8/10.  Patient examined in ED. Patient resting comfortably/lying down on back. Patient denies fever/chills, dysphagia, dyspnea.   -----------------------------    MEDICATIONS  (STANDING):    MEDICATIONS  (PRN):      ICU Vital Signs Last 24 Hrs  T(C): 36.7 (08 Jan 2020 02:50), Max: 37.1 (08 Jan 2020 01:44)  T(F): 98 (08 Jan 2020 02:50), Max: 98.8 (08 Jan 2020 01:44)  HR: 176 (08 Jan 2020 02:50) (88 - 176)  BP: 101/61 (08 Jan 2020 02:50) (101/61 - 134/86)  RR: 18 (08 Jan 2020 02:50) (16 - 18)  SpO2: 97% (08 Jan 2020 02:50) (97% - 100%)      Physical Exam:  Gen: AAox3, NAD, NC/AT, lying comfortably.   Extraoral: Left neck - firm, palpable swelling, pain on palpation. Swelling is overlying left body/inferior border of mandible and palpable above and below border. No erythema of overlying skin.   Intraoral Exam: SOLOMON: ( ~15-20mm  ), Trismus noted. Tooth #18 with temporary filling on occlusal surface. No intraoral swelling/fluctuance noted. No pain on palpation/tenderness of lingual and buccal aspects of posterior left quadrant.   Occlusion stable and reproducible, FOM soft, NT.    CBC Full  -  ( 07 Jan 2020 17:08 )  WBC Count : 8.56 K/uL  RBC Count : 4.40 M/uL  Hemoglobin : 13.4 g/dL  Hematocrit : 40.8 %  Platelet Count - Automated : 271 K/uL  Mean Cell Volume : 92.7 fL  Mean Cell Hemoglobin : 30.5 pg  Mean Cell Hemoglobin Concentration : 32.8 %  Auto Neutrophil # : 5.79 K/uL  Auto Lymphocyte # : 1.91 K/uL  Auto Monocyte # : 0.64 K/uL  Auto Eosinophil # : 0.16 K/uL  Auto Basophil # : 0.04 K/uL  Auto Neutrophil % : 67.6 %  Auto Lymphocyte % : 22.3 %  Auto Monocyte % : 7.5 %  Auto Eosinophil % : 1.9 %  Auto Basophil % : 0.5 %      Assessment/Plan:   HD 1: 49y Female with Necrotic Tooth #18 with associated Left submandibular space abscess.   - Rec IV Abx  - Rec Pain control prn   - OMFS to re-evaluate patient in AM, possible I&D and Extraction of tooth #18 in clinic during regular hours.   - Rec soft diet      Case discussed with attending and chief resident.     DHRUV Gardner   [facility] [id number]1: 49 year old female with submandibular swelling associated with Tooth #18. Patient examined at admitting floor bed, patient resting comfortably. Patient subjectively reports feeling better. VSSAF. Patient denies fever/chills, dysphagia, dyspnea.   Allergic: Penicillin     ------------------------  Patient is a 49 year old female with no PMHx who presents with left submandibular swelling. Patient reports that her initial symptoms started 2 months prior when she experienced some gum pain. About a week ago, she began to feel discomfort at the lower left area and noticed a small area of swelling. She went to her dentist last Friday who referred her to an endodontist (Dr. Boo Chicas) and root canal therapy was initiated. Patient was prescribed Clindamycin last Friday. This morning, she woke up with increased swelling and reported back to her endodontist, who referred her to an oral surgeon  ([facility]) who referred her to the ED for evaluation.   Patient now reports firm neck swelling and constant throbbing at lower left molar (tooth #18). Patient managing pain with Motrin, reports occasional pain to 8/10.  Patient examined in ED. Patient resting comfortably/lying down on back. Patient denies fever/chills, dysphagia, dyspnea.   -----------------------------    MEDICATIONS  (STANDING):    MEDICATIONS  (PRN):      ICU Vital Signs Last 24 Hrs  T(C): 36.7 (08 Jan 2020 02:50), Max: 37.1 (08 Jan 2020 01:44)  T(F): 98 (08 Jan 2020 02:50), Max: 98.8 (08 Jan 2020 01:44)  HR: 176 (08 Jan 2020 02:50) (88 - 176)  BP: 101/61 (08 Jan 2020 02:50) (101/61 - 134/86)  RR: 18 (08 Jan 2020 02:50) (16 - 18)  SpO2: 97% (08 Jan 2020 02:50) (97% - 100%)      Physical Exam:  Gen: AAox3, NAD, NC/AT, lying comfortably.   Extraoral: Left neck - firm, palpable swelling, pain on palpation. Swelling is overlying left body/inferior border of mandible and palpable above and below border. No erythema of overlying skin.   Intraoral Exam: SOLOMON: ( ~15-20mm  ), Trismus noted. Tooth #18 with temporary filling on occlusal surface. No intraoral swelling/fluctuance noted. No pain on palpation/tenderness of lingual and buccal aspects of posterior left quadrant.   Occlusion stable and reproducible, FOM soft, NT.    CBC Full  -  ( 07 Jan 2020 17:08 )  WBC Count : 8.56 K/uL  RBC Count : 4.40 M/uL  Hemoglobin : 13.4 g/dL  Hematocrit : 40.8 %  Platelet Count - Automated : 271 K/uL  Mean Cell Volume : 92.7 fL  Mean Cell Hemoglobin : 30.5 pg  Mean Cell Hemoglobin Concentration : 32.8 %  Auto Neutrophil # : 5.79 K/uL  Auto Lymphocyte # : 1.91 K/uL  Auto Monocyte # : 0.64 K/uL  Auto Eosinophil # : 0.16 K/uL  Auto Basophil # : 0.04 K/uL  Auto Neutrophil % : 67.6 %  Auto Lymphocyte % : 22.3 %  Auto Monocyte % : 7.5 %  Auto Eosinophil % : 1.9 %  Auto Basophil % : 0.5 %        Assessment/Plan:   HD 1: 49y Female with Necrotic Tooth #18 with associated Left submandibular space abscess.   - Rec continued IV Abx  - Rec Pain control prn   - NPO      Case discussed with attending and chief resident.     DHRUV Gardner   [facility] [id number]1: 49 year old female with submandibular swelling associated with Tooth #18. Patient examined at admitting floor bed, patient resting comfortably. Patient subjectively reports feeling better. VSSAF. Patient denies fever/chills, dysphagia, dyspnea.   Allergic: Penicillin     ------------------------  Patient is a 49 year old female with no PMHx who presents with left submandibular swelling. Patient reports that her initial symptoms started 2 months prior when she experienced some gum pain. About a week ago, she began to feel discomfort at the lower left area and noticed a small area of swelling. She went to her dentist last Friday who referred her to an endodontist (Dr. Boo Chicas) and root canal therapy was initiated. Patient was prescribed Clindamycin last Friday. This morning, she woke up with increased swelling and reported back to her endodontist, who referred her to an oral surgeon  ([facility]) who referred her to the ED for evaluation.   Patient now reports firm neck swelling and constant throbbing at lower left molar (tooth #18). Patient managing pain with Motrin, reports occasional pain to 8/10.  Patient examined in ED. Patient resting comfortably/lying down on back. Patient denies fever/chills, dysphagia, dyspnea.   -----------------------------    MEDICATIONS  (STANDING):    MEDICATIONS  (PRN):      ICU Vital Signs Last 24 Hrs  T(C): 36.7 (08 Jan 2020 02:50), Max: 37.1 (08 Jan 2020 01:44)  T(F): 98 (08 Jan 2020 02:50), Max: 98.8 (08 Jan 2020 01:44)  HR: 176 (08 Jan 2020 02:50) (88 - 176)  BP: 101/61 (08 Jan 2020 02:50) (101/61 - 134/86)  RR: 18 (08 Jan 2020 02:50) (16 - 18)  SpO2: 97% (08 Jan 2020 02:50) (97% - 100%)      Physical Exam:  Gen: AAox3, NAD, NC/AT, lying comfortably.   Extraoral: Left neck - firm, palpable swelling, pain on palpation. Swelling is overlying left body/inferior border of mandible and palpable above and below border. No erythema of overlying skin.   Intraoral Exam: SOLOMON: ( ~15-20mm  ), Trismus noted. Tooth #18 with temporary filling on occlusal surface. No intraoral swelling/fluctuance noted. No pain on palpation/tenderness of lingual and buccal aspects of posterior left quadrant.   Occlusion stable and reproducible, FOM soft, NT.    CBC Full  -  ( 07 Jan 2020 17:08 )  WBC Count : 8.56 K/uL  RBC Count : 4.40 M/uL  Hemoglobin : 13.4 g/dL  Hematocrit : 40.8 %  Platelet Count - Automated : 271 K/uL  Mean Cell Volume : 92.7 fL  Mean Cell Hemoglobin : 30.5 pg  Mean Cell Hemoglobin Concentration : 32.8 %  Auto Neutrophil # : 5.79 K/uL  Auto Lymphocyte # : 1.91 K/uL  Auto Monocyte # : 0.64 K/uL  Auto Eosinophil # : 0.16 K/uL  Auto Basophil # : 0.04 K/uL  Auto Neutrophil % : 67.6 %  Auto Lymphocyte % : 22.3 %  Auto Monocyte % : 7.5 %  Auto Eosinophil % : 1.9 %  Auto Basophil % : 0.5 %        Assessment/Plan:   HD 1: 49y Female with Necrotic Tooth #18 with associated Left submandibular space abscess.   - Rec continued IV Abx  - Rec Pain control prn   - Patient will possibly be added on to go to OR for I&D of facial abscess and Extraction tooth #18 pending patient course.   - Please make patient NPO, Obtain Labs (CBC, BMP, Coags), Chest X-ray, pregnancy test.     Case discussed with attending and chief resident.     DHRUV Gardner   [facility] [phone]

## 2020-01-08 NOTE — H&P ADULT - PROBLEM SELECTOR PLAN 1
- c/w meropenem 1g q 8 hrs as pt has Penicillin allergy  - Pain control w/ Tylenol   - Plan for possible abscess I&D and extraction of Tooth # 18 by OMFS  - OMFS following, recs appreciated

## 2020-01-08 NOTE — H&P ADULT - NSHPSOCIALHISTORY_GEN_ALL_CORE
-  w/ one child (10 yrs old)  - Living at home w/ family  - Works as   - Never smoker  -   - Denies illicit drug use -  w/ one child (10 yrs old)  - Living at home w/ family  - Works as   - Exercises regularly   - Tries to maintain a well balanced diet  - Never smoker  - Denies EtOH use  - Denies illicit drug use

## 2020-01-08 NOTE — H&P ADULT - NSHPREVIEWOFSYSTEMS_GEN_ALL_CORE
REVIEW OF SYSTEMS:    CONSTITUTIONAL: No weakness, fevers or chills  EYES/ENT: No visual changes;  No vertigo or throat pain   NECK: Neck pain 2/2 firmness at submandibular region  RESPIRATORY: No cough, wheezing, hemoptysis; No shortness of breath  CARDIOVASCULAR: No chest pain or palpitations  GASTROINTESTINAL: No abdominal or epigastric pain. No nausea, vomiting, or hematemesis; No diarrhea or constipation. No melena or hematochezia.  GENITOURINARY: No dysuria, frequency or hematuria  NEUROLOGICAL: No numbness or weakness CONSTITUTIONAL: No weakness, fevers or chills  EYES/ENT: No visual changes;  No vertigo or throat pain   NECK: Neck pain 2/2 firmness at submandibular region  RESPIRATORY: No cough, wheezing, hemoptysis; No shortness of breath  CARDIOVASCULAR: No chest pain or palpitations  GASTROINTESTINAL: No abdominal or epigastric pain. No nausea, vomiting, or hematemesis; No diarrhea or constipation. No melena or hematochezia.  GENITOURINARY: No dysuria, frequency or hematuria  NEUROLOGICAL: No numbness or weakness

## 2020-01-09 ENCOUNTER — TRANSCRIPTION ENCOUNTER (OUTPATIENT)
Age: 50
End: 2020-01-09

## 2020-01-09 PROCEDURE — 99233 SBSQ HOSP IP/OBS HIGH 50: CPT | Mod: GC

## 2020-01-09 RX ORDER — CHLORHEXIDINE GLUCONATE 213 G/1000ML
15 SOLUTION TOPICAL
Refills: 0 | Status: DISCONTINUED | OUTPATIENT
Start: 2020-01-09 | End: 2020-01-10

## 2020-01-09 RX ADMIN — Medication 650 MILLIGRAM(S): at 10:50

## 2020-01-09 RX ADMIN — MEROPENEM 100 MILLIGRAM(S): 1 INJECTION INTRAVENOUS at 02:52

## 2020-01-09 RX ADMIN — Medication 650 MILLIGRAM(S): at 02:58

## 2020-01-09 RX ADMIN — Medication 650 MILLIGRAM(S): at 10:20

## 2020-01-09 RX ADMIN — Medication 650 MILLIGRAM(S): at 16:44

## 2020-01-09 RX ADMIN — MEROPENEM 100 MILLIGRAM(S): 1 INJECTION INTRAVENOUS at 10:14

## 2020-01-09 RX ADMIN — CHLORHEXIDINE GLUCONATE 15 MILLILITER(S): 213 SOLUTION TOPICAL at 19:27

## 2020-01-09 RX ADMIN — Medication 650 MILLIGRAM(S): at 17:12

## 2020-01-09 RX ADMIN — MEROPENEM 100 MILLIGRAM(S): 1 INJECTION INTRAVENOUS at 18:57

## 2020-01-09 RX ADMIN — Medication 650 MILLIGRAM(S): at 22:43

## 2020-01-09 RX ADMIN — Medication 650 MILLIGRAM(S): at 03:58

## 2020-01-09 RX ADMIN — Medication 650 MILLIGRAM(S): at 23:33

## 2020-01-09 NOTE — PROGRESS NOTE ADULT - ASSESSMENT
50 yo F w/ no significant PMH presenting w/ L mandibular pain and swelling in the setting of a necrotic Tooth #18 with associated Left sublingual space abscess noted on imaging.

## 2020-01-09 NOTE — PROGRESS NOTE ADULT - PROBLEM SELECTOR PLAN 1
- s/p I&D of sublingual abscess and extraction of tooth # 18  - c/w meropenem 1g q 8 hrs as pt has Penicillin allergy  - Pain control w/ PRN Tylenol   - OMFS following, recs appreciated

## 2020-01-09 NOTE — DISCHARGE NOTE PROVIDER - CARE PROVIDERS DIRECT ADDRESSES
,[email or web address]

## 2020-01-09 NOTE — DISCHARGE NOTE PROVIDER - NSDCCPCAREPLAN_GEN_ALL_CORE_FT
PRINCIPAL DISCHARGE DIAGNOSIS  Diagnosis: Sublingual abscess  Assessment and Plan of Treatment: You presented to the hospital from your endodontist because of tooth decay as well as an abscess formation below that tooth. You were treated w/ intravenous antibiotics and were seen by OMFS (Oral Maxilofacial Surgery) and underwent an I&D (Opening and cleaning of the abscess) as well as the removal of that decayed tooth. You are now safe for discharge home with follow up with a primary care doctor and your dentist. PRINCIPAL DISCHARGE DIAGNOSIS  Diagnosis: Sublingual abscess  Assessment and Plan of Treatment: You presented to the hospital from your endodontist because of tooth decay as well as an abscess formation below that tooth. You were treated w/ intravenous antibiotics and were seen by OMFS (Oral Maxilofacial Surgery) and underwent an I&D (Opening and cleaning of the abscess) as well as the removal of that decayed tooth. You are to be discharged on oral antibiotics w/ Levaquin 750mg daily for a total of 7 days. Please follow up w/ Dr. Pittman within 7 days for management outpatient. You are now safe for discharge home with follow up with a primary care doctor and your dentist.

## 2020-01-09 NOTE — DISCHARGE NOTE PROVIDER - NSDCMRMEDTOKEN_GEN_ALL_CORE_FT
Levaquin 750 mg oral tablet: 1 tab(s) orally once a day   Peridex 0.12% mucous membrane liquid: 15 milliliter(s) orally 2 times a day

## 2020-01-09 NOTE — DISCHARGE NOTE PROVIDER - NSDCFUADDAPPT_GEN_ALL_CORE_FT
no - Please call [phone] to schedule appointment for follow up w/ Dental medicine  - Please continue w/ Peridex Mouth rinse twice a day - Please call [phone] to schedule appointment for follow up with the oral-maxillofacial surgeons  - Please continue w/ Peridex Mouth rinse twice a day

## 2020-01-09 NOTE — DISCHARGE NOTE NURSING/CASE MANAGEMENT/SOCIAL WORK - NSDCFUADDAPPT_GEN_ALL_CORE_FT
- Please call [phone] to schedule appointment for follow up w/ Dental medicine  - Please continue w/ Peridex Mouth rinse twice a day

## 2020-01-09 NOTE — DISCHARGE NOTE PROVIDER - HOSPITAL COURSE
48 yo F w/ no significant PMH presenting w/ L mandibular pain and swelling in the setting of a necrotic Tooth #18 with associated Left sublingual space abscess noted on imaging. While in the hospital, pt was on meropenem 1000mg every 8 hours. Patient was seen by OMFS and underwent a tooth extraction of tooth #18 and an I&D of the abscess from which cultures were sent. She tolerated the procedure well. 50 yo F w/ no significant PMH presenting w/ L mandibular pain and swelling in the setting of a necrotic Tooth #18 with associated Left sublingual space abscess noted on imaging. Pt was started on meropenem 1000mg every 8 hours. She was seen by OMFS and underwent a tooth extraction of tooth #18 and an I&D of the abscess from which cultures were sent. She tolerated the procedure well and had no complaints post-op. She is now safe for discharge home w/ follow up w/ primary care doctor and OMFS outpatient. 50 yo F w/ no significant PMH presenting w/ L mandibular pain and swelling in the setting of a necrotic Tooth #18 with associated Left sublingual space abscess noted on imaging. Pt was started on meropenem 1000mg every 8 hours. She was seen by OMFS and underwent a tooth extraction of tooth #18 and an I&D of the abscess from which culture were sent growing Gram positive cocci in clusters. She tolerated the procedure well and had no complaints post-op. She is now safe for discharge home on Levaquin 750mg QD for 7 days  w/ follow up w/ primary care doctor and OMFS (Dr. Dona Pittman) outpatient within the week. 48 yo F w/ no significant PMH presenting w/ L mandibular pain and swelling in the setting of a necrotic Tooth #18 with associated Left sublingual space abscess noted on imaging. Pt was started on meropenem 1000mg every 8 hours. She was seen by OMFS and underwent a tooth extraction of tooth #18 and an I&D of the abscess from which culture were sent growing strep intermedius. She tolerated the procedure well and had no complaints post-op. She is now safe for discharge home on Levaquin 750mg QD for 7 days  w/ follow up w/ primary care doctor and OMFS (Dr. Dona Pittman) outpatient within the week.

## 2020-01-09 NOTE — PROGRESS NOTE ADULT - ATTENDING COMMENTS
I have personally seen, examined, and participated in the care of this patient, I have reviewed all pertinent clinical information, including history, physical exam, plan, and the above resident's note. The case and plan have been discussed with resident, above note edited where appropriate.  updated at bedside.   Mis Guaman MD

## 2020-01-09 NOTE — DISCHARGE NOTE NURSING/CASE MANAGEMENT/SOCIAL WORK - NSDCPNINST_GEN_ALL_CORE
patient alert and clinically stable. patient drsg s/p I/D and on ABX. patient independent with daily care. patient clinically stable for discharged to home. written and verbal and information to be given. safety maintained. side effects of medication management will be provided.

## 2020-01-09 NOTE — PROGRESS NOTE ADULT - SUBJECTIVE AND OBJECTIVE BOX
ANESTHESIA POSTOP CHECK    49y Female POSTOP DAY 1 S/P     Vital Signs Last 24 Hrs  T(C): 36.7 (09 Jan 2020 06:18), Max: 37 (08 Jan 2020 19:25)  T(F): 98 (09 Jan 2020 06:18), Max: 98.6 (08 Jan 2020 19:25)  HR: 62 (09 Jan 2020 06:18) (62 - 78)  BP: 109/61 (09 Jan 2020 06:18) (81/56 - 127/60)  BP(mean): 79 (08 Jan 2020 20:30) (73 - 80)  RR: 17 (09 Jan 2020 06:18) (13 - 19)  SpO2: 96% (09 Jan 2020 06:18) (94% - 99%)  I&O's Summary    08 Jan 2020 07:01  -  09 Jan 2020 07:00  --------------------------------------------------------  IN: 580 mL / OUT: 0 mL / NET: 580 mL        [ x] NO APPARENT ANESTHESIA COMPLICATIONS

## 2020-01-09 NOTE — PROGRESS NOTE ADULT - SUBJECTIVE AND OBJECTIVE BOX
HD2 POD1: 49 year old female 1 day s/p I&D of Submandibular space abscess and Extraction tooth #18.   Patient examined at admitting floor bed, patient doing well post operatively, resting comfortably. Patient subjectively reports feeling better. VSSAF. Patient denies fever/chills, dysphagia, dyspnea. Left submandibular extraoral penrose drain in place with overlying dressing.     Allergic: Penicillin     ------------------------  Patient is a 49 year old female with no PMHx who presents with left submandibular swelling. Patient reports that her initial symptoms started 2 months prior when she experienced some gum pain. About a week ago, she began to feel discomfort at the lower left area and noticed a small area of swelling. She went to her dentist last Friday who referred her to an endodontist (Dr. Boo Chicas) and root canal therapy was initiated. Patient was prescribed Clindamycin last Friday. This morning, she woke up with increased swelling and reported back to her endodontist, who referred her to an oral surgeon  ([facility] Oral [facility]) who referred her to the ED for evaluation.   Patient now reports firm neck swelling and constant throbbing at lower left molar (tooth #18). Patient managing pain with Motrin, reports occasional pain to 8/10.  Patient examined in ED. Patient resting comfortably/lying down on back. Patient denies fever/chills, dysphagia, dyspnea.   -----------------------------    MEDICATIONS  (STANDING):  meropenem  IVPB 1000 milliGRAM(s) IV Intermittent every 8 hours  meropenem  IVPB      sodium chloride 0.9%. 1000 milliLiter(s) (100 mL/Hr) IV Continuous <Continuous>    MEDICATIONS  (PRN):  acetaminophen   Tablet .. 650 milliGRAM(s) Oral every 4 hours PRN Mild Pain (1 - 3)      ICU Vital Signs Last 24 Hrs  T(C): 36.7 (09 Jan 2020 06:18), Max: 37 (08 Jan 2020 19:25)  T(F): 98 (09 Jan 2020 06:18), Max: 98.6 (08 Jan 2020 19:25)  HR: 62 (09 Jan 2020 06:18) (62 - 78)  BP: 109/61 (09 Jan 2020 06:18) (81/56 - 127/60)  BP(mean): 79 (08 Jan 2020 20:30) (73 - 80)  ABP: --  ABP(mean): --  RR: 17 (09 Jan 2020 06:18) (13 - 19)  SpO2: 96% (09 Jan 2020 06:18) (94% - 99%)      Physical Exam:  Gen: AAox3, NAD, NC/AT, lying comfortably.   Extraoral: Left neck - firm, palpable swelling, mild discomfort on palpation.   Left submandibular penrose drain in place with suture in place.   Swelling is overlying left body/inferior border of mandible and palpable above and below border. No erythema of overlying skin.      Intraoral Exam: SOLOMON: ( ~15mm ), Trismus noted. Tooth #18 extraction site hemostatic, no purulence noted on palpation, tissue well approximated.   No intraoral swelling/fluctuance noted.   Occlusion stable and reproducible, FOM soft, NT.    CBC Full  -  ( 08 Jan 2020 12:10 )  WBC Count : 7.85 K/uL  RBC Count : 4.01 M/uL  Hemoglobin : 12.3 g/dL  Hematocrit : 36.0 %  Platelet Count - Automated : 239 K/uL  Mean Cell Volume : 89.8 fL  Mean Cell Hemoglobin : 30.7 pg  Mean Cell Hemoglobin Concentration : 34.2 %        Assessment/Plan:     HD 2 POD 1:  49 year old female 1 day s/p I&D of Submandibular space abscess and Extraction tooth #18, patient doing well post operatively and recovering as anticipated.   - Rec continued IV Abx  - Rec Pain control prn   - Peridex Mouth Rinse, BID  - Patient shown how to perform stretches to help with trismus/limited opening, Rec warm compresses       DHRUV Gardner   [facility] [id number] HD2 POD1: 49 year old female 1 day s/p I&D of Submandibular space abscess and Extraction tooth #18.   Patient examined at admitting floor bed, patient doing well post operatively, resting comfortably. Patient subjectively reports feeling better. VSSAF. Patient denies fever/chills, dysphagia, dyspnea. Left submandibular extraoral penrose drain in place with overlying dressing.     Allergic: Penicillin     ------------------------  Patient is a 49 year old female with no PMHx who presents with left submandibular swelling. Patient reports that her initial symptoms started 2 months prior when she experienced some gum pain. About a week ago, she began to feel discomfort at the lower left area and noticed a small area of swelling. She went to her dentist last Friday who referred her to an endodontist (Dr. Boo Chicas) and root canal therapy was initiated. Patient was prescribed Clindamycin last Friday. This morning, she woke up with increased swelling and reported back to her endodontist, who referred her to an oral surgeon  ([facility]) who referred her to the ED for evaluation.   Patient now reports firm neck swelling and constant throbbing at lower left molar (tooth #18). Patient managing pain with Motrin, reports occasional pain to 8/10.  Patient examined in ED. Patient resting comfortably/lying down on back. Patient denies fever/chills, dysphagia, dyspnea.   -----------------------------    MEDICATIONS  (STANDING):  meropenem  IVPB 1000 milliGRAM(s) IV Intermittent every 8 hours  meropenem  IVPB      sodium chloride 0.9%. 1000 milliLiter(s) (100 mL/Hr) IV Continuous <Continuous>    MEDICATIONS  (PRN):  acetaminophen   Tablet .. 650 milliGRAM(s) Oral every 4 hours PRN Mild Pain (1 - 3)      ICU Vital Signs Last 24 Hrs  T(C): 36.7 (09 Jan 2020 06:18), Max: 37 (08 Jan 2020 19:25)  T(F): 98 (09 Jan 2020 06:18), Max: 98.6 (08 Jan 2020 19:25)  HR: 62 (09 Jan 2020 06:18) (62 - 78)  BP: 109/61 (09 Jan 2020 06:18) (81/56 - 127/60)  BP(mean): 79 (08 Jan 2020 20:30) (73 - 80)  ABP: --  ABP(mean): --  RR: 17 (09 Jan 2020 06:18) (13 - 19)  SpO2: 96% (09 Jan 2020 06:18) (94% - 99%)      Physical Exam:  Gen: AAox3, NAD, NC/AT, lying comfortably.   Extraoral: Left neck - firm, palpable swelling, mild discomfort on palpation.   Left submandibular penrose drain in place with suture in place.   Swelling is overlying left body/inferior border of mandible and palpable above and below border. No erythema of overlying skin.   CN VII MM branch dermatone intact, no deficiency noted.        Intraoral Exam: SOLOMON: ( ~15mm ), Trismus noted. Tooth #18 extraction site hemostatic, no purulence noted on palpation, tissue well approximated.   No intraoral swelling/fluctuance noted.   Occlusion stable and reproducible, FOM soft, NT.    CBC Full  -  ( 08 Jan 2020 12:10 )  WBC Count : 7.85 K/uL  RBC Count : 4.01 M/uL  Hemoglobin : 12.3 g/dL  Hematocrit : 36.0 %  Platelet Count - Automated : 239 K/uL  Mean Cell Volume : 89.8 fL  Mean Cell Hemoglobin : 30.7 pg  Mean Cell Hemoglobin Concentration : 34.2 %        Assessment/Plan:     HD 2 POD 1:  49 year old female 1 day s/p I&D of Submandibular space abscess and Extraction tooth #18, patient doing well post operatively and recovering as anticipated.   - Rec continued IV Abx  - Rec Pain control prn   - Peridex Mouth Rinse, BID  - Patient shown how to perform stretches to help with trismus/limited opening, Rec warm compresses       DHRUV Gardner   [facility] [id number]

## 2020-01-09 NOTE — DISCHARGE NOTE NURSING/CASE MANAGEMENT/SOCIAL WORK - PATIENT PORTAL LINK FT
You can access the FollowMyHealth Patient Portal offered by [facility] by registering at the following website: [email or web address]. By joining [facility]’s FollowMyHealth portal, you will also be able to view your health information using other applications (apps) compatible with our system.

## 2020-01-09 NOTE — DISCHARGE NOTE PROVIDER - CARE PROVIDER_API CALL
Joselito Melchor)  Internal Medicine  [address], [place] [id number]  Phone: [phone]  Fax: [phone]  Follow Up Time: 1 week Joselito Melchor (MD)  Internal Medicine  [address] [place] [id number]  Phone: [phone]  Fax: [phone]  Follow Up Time: 1 week    Dona Pittman (DDS; MD)  Dental Medicine  [address] [place] [address] [address]  [place] [address]  Phone: [phone]  Fax: [phone]  Follow Up Time: 1 week

## 2020-01-09 NOTE — DISCHARGE NOTE PROVIDER - PROVIDER TOKENS
PROVIDER:[TOKEN:[[id number]],FOLLOWUP:[1 week]] PROVIDER:[TOKEN:[[id number]],FOLLOWUP:[1 week]],PROVIDER:[TOKEN:[[id number]],FOLLOWUP:[1 week]]

## 2020-01-10 VITALS
RESPIRATION RATE: 18 BRPM | HEART RATE: 65 BPM | OXYGEN SATURATION: 100 % | DIASTOLIC BLOOD PRESSURE: 75 MMHG | SYSTOLIC BLOOD PRESSURE: 124 MMHG | TEMPERATURE: 98 F

## 2020-01-10 PROCEDURE — 99239 HOSP IP/OBS DSCHRG MGMT >30: CPT | Mod: GC

## 2020-01-10 RX ORDER — CHLORHEXIDINE GLUCONATE 213 G/1000ML
15 SOLUTION TOPICAL
Qty: 1 | Refills: 0
Start: 2020-01-10 | End: 2020-01-16

## 2020-01-10 RX ADMIN — MEROPENEM 100 MILLIGRAM(S): 1 INJECTION INTRAVENOUS at 02:03

## 2020-01-10 RX ADMIN — MEROPENEM 100 MILLIGRAM(S): 1 INJECTION INTRAVENOUS at 10:19

## 2020-01-10 RX ADMIN — CHLORHEXIDINE GLUCONATE 15 MILLILITER(S): 213 SOLUTION TOPICAL at 06:09

## 2020-01-10 NOTE — PROGRESS NOTE ADULT - PROBLEM SELECTOR PLAN 1
- s/p I&D of sublingual abscess and extraction of tooth # 18, today POD#2  - c/w meropenem 1g q 8 hrs as pt has Penicillin allergy, per OMFS  - Pain control w/ PRN Tylenol   - OMFS following, recs appreciated - s/p I&D of sublingual abscess and extraction of tooth # 18, today POD#2  - s/p drain removal today by OMFS  - Per OMFS, pt stable for discharge with PO abx and outpatient follow up  - Pain control w/ PRN Tylenol

## 2020-01-10 NOTE — PROGRESS NOTE ADULT - SUBJECTIVE AND OBJECTIVE BOX
HD3 POD2: 49 year old female 2 days s/p I&D of Submandibular space abscess and Extraction tooth #18.   Patient examined at admitting floor bed, patient doing well post operatively, resting comfortably. Patient subjectively reports feeling better. VSSAF. Patient denies fever/chills, dysphagia, dyspnea. Left submandibular extraoral penrose drain in place with overlying dressing, minimal output.     Allergic: Penicillin     ------------------------  Patient is a 49 year old female with no PMHx who presents with left submandibular swelling. Patient reports that her initial symptoms started 2 months prior when she experienced some gum pain. About a week ago, she began to feel discomfort at the lower left area and noticed a small area of swelling. She went to her dentist last Friday who referred her to an endodontist (Dr. Boo Chicas) and root canal therapy was initiated. Patient was prescribed Clindamycin last Friday. This morning, she woke up with increased swelling and reported back to her endodontist, who referred her to an oral surgeon  ([facility] Oral [facility]) who referred her to the ED for evaluation.   Patient now reports firm neck swelling and constant throbbing at lower left molar (tooth #18). Patient managing pain with Motrin, reports occasional pain to 8/10.  Patient examined in ED. Patient resting comfortably/lying down on back. Patient denies fever/chills, dysphagia, dyspnea.   -----------------------------    MEDICATIONS  (STANDING):  chlorhexidine 0.12% Liquid 15 milliLiter(s) Swish and Spit two times a day  meropenem  IVPB 1000 milliGRAM(s) IV Intermittent every 8 hours  meropenem  IVPB        MEDICATIONS  (PRN):  acetaminophen   Tablet .. 650 milliGRAM(s) Oral every 4 hours PRN Mild Pain (1 - 3)    ICU Vital Signs Last 24 Hrs  T(C): 36.7 (10 Ruben 2020 06:07), Max: 37.4 (09 Jan 2020 22:05)  T(F): 98 (10 Ruben 2020 06:07), Max: 99.3 (09 Jan 2020 22:05)  HR: 65 (10 Ruben 2020 06:07) (65 - 83)  BP: 124/75 (10 Ruben 2020 06:07) (92/52 - 124/75)  BP(mean): --  ABP: --  ABP(mean): --  RR: 18 (10 Ruben 2020 06:07) (16 - 18)  SpO2: 100% (10 Ruben 2020 06:07) (96% - 100%)      Physical Exam:  Gen: AAox3, NAD, NC/AT, lying comfortably.   Extraoral: Left neck - firm, palpable swelling, mild discomfort on palpation.   Left submandibular penrose drain in place with suture in place.   Swelling is overlying left body/inferior border of mandible and palpable above and below border. No erythema of overlying skin.   CN VII MM branch dermatone intact, no deficiency noted.        Intraoral Exam: SOLOMON: ( ~20mm ), Improving Trismus. Tooth #18 extraction site hemostatic, no purulence noted on palpation, tissue well approximated.   No intraoral swelling/fluctuance noted.   Occlusion stable and reproducible, FOM soft, NT.    CBC Full  -  ( 08 Jan 2020 12:10 )  WBC Count : 7.85 K/uL  RBC Count : 4.01 M/uL  Hemoglobin : 12.3 g/dL  Hematocrit : 36.0 %  Platelet Count - Automated : 239 K/uL  Mean Cell Volume : 89.8 fL  Mean Cell Hemoglobin : 30.7 pg  Mean Cell Hemoglobin Concentration : 34.2 %        Assessment/Plan:     HD 3 POD 2:  49 year old female 2 days s/p I&D of Submandibular space abscess and Extraction tooth #18, patient doing well post operatively and recovering as anticipated.   - Rec continued IV Abx  - Rec Pain control prn   - Peridex Mouth Rinse, BID  - Patient to continue performing stretches to help with trismus/limited opening, Rec warm compresses.       DHRUV Gardner   [facility] [id number] HD3 POD2: 49 year old female 2 days s/p I&D of Submandibular space abscess and Extraction tooth #18.   Patient examined at admitting floor bed, patient doing well post operatively, resting comfortably. Patient subjectively reports feeling better. VSSAF. Patient denies fever/chills, dysphagia, dyspnea. Left submandibular extraoral penrose drain in place with overlying dressing, minimal output.     Allergic: Penicillin     ------------------------  Patient is a 49 year old female with no PMHx who presents with left submandibular swelling. Patient reports that her initial symptoms started 2 months prior when she experienced some gum pain. About a week ago, she began to feel discomfort at the lower left area and noticed a small area of swelling. She went to her dentist last Friday who referred her to an endodontist (Dr. Boo Chicas) and root canal therapy was initiated. Patient was prescribed Clindamycin last Friday. This morning, she woke up with increased swelling and reported back to her endodontist, who referred her to an oral surgeon  (Progressive Oral Surgery) who referred her to the ED for evaluation.   Patient now reports firm neck swelling and constant throbbing at lower left molar (tooth #18). Patient managing pain with Motrin, reports occasional pain to 8/10.  Patient examined in ED. Patient resting comfortably/lying down on back. Patient denies fever/chills, dysphagia, dyspnea.   -----------------------------    MEDICATIONS  (STANDING):  chlorhexidine 0.12% Liquid 15 milliLiter(s) Swish and Spit two times a day  meropenem  IVPB 1000 milliGRAM(s) IV Intermittent every 8 hours  meropenem  IVPB        MEDICATIONS  (PRN):  acetaminophen   Tablet .. 650 milliGRAM(s) Oral every 4 hours PRN Mild Pain (1 - 3)    ICU Vital Signs Last 24 Hrs  T(C): 36.7 (10 Ruben 2020 06:07), Max: 37.4 (09 Jan 2020 22:05)  T(F): 98 (10 Ruben 2020 06:07), Max: 99.3 (09 Jan 2020 22:05)  HR: 65 (10 Ruben 2020 06:07) (65 - 83)  BP: 124/75 (10 Ruben 2020 06:07) (92/52 - 124/75)  BP(mean): --  ABP: --  ABP(mean): --  RR: 18 (10 Ruben 2020 06:07) (16 - 18)  SpO2: 100% (10 Ruben 2020 06:07) (96% - 100%)      Physical Exam:  Gen: AAox3, NAD, NC/AT, lying comfortably.   Extraoral: Left neck - firm, palpable swelling, mild discomfort on palpation.   Left submandibular penrose drain in place with suture in place.   Swelling is overlying left body/inferior border of mandible and palpable above and below border. No erythema of overlying skin.   CN VII MM branch dermatone intact, no deficiency noted.        Intraoral Exam: SOLOMON: ( ~20mm ), Improving Trismus. Tooth #18 extraction site hemostatic, no purulence noted on palpation, tissue well approximated.   No intraoral swelling/fluctuance noted.   Occlusion stable and reproducible, FOM soft, NT.    CBC Full  -  ( 08 Jan 2020 12:10 )  WBC Count : 7.85 K/uL  RBC Count : 4.01 M/uL  Hemoglobin : 12.3 g/dL  Hematocrit : 36.0 %  Platelet Count - Automated : 239 K/uL  Mean Cell Volume : 89.8 fL  Mean Cell Hemoglobin : 30.7 pg  Mean Cell Hemoglobin Concentration : 34.2 %        Assessment/Plan:     HD 3 POD 2:  49 year old female 2 days s/p I&D of Submandibular space abscess and Extraction tooth #18, patient doing well post operatively and recovering as anticipated.   - Plan to remove extraoral drain today, Patient clear for discharge after drain removal from [facility] standpoint.   Patient to be discharged on PO ABx, Peridex Mouth rinse BID, Follow up in [facility] as outpatient in 1 week.   Patient to call [phone] to make appointment for follow up.   - Rec continued IV Abx  - Rec Pain control prn   - Peridex Mouth Rinse, BID  - Patient to continue performing stretches to help with trismus/limited opening, Rec warm compresses.       DHRUV Gardner   [facility] [id number]

## 2020-01-10 NOTE — PROGRESS NOTE ADULT - PROBLEM SELECTOR PLAN 2
DVT Ppx: Improve score 0  Diet: Regular    Dispo: Pending clinical improvement DVT Ppx: Improve score 0  Diet: Regular    Dispo: home today

## 2020-01-10 NOTE — PROGRESS NOTE ADULT - ATTENDING COMMENTS
I have personally seen, examined, and participated in the care of this patient, I have reviewed all pertinent clinical information, including history, physical exam, plan, and the above resident's note. The case and plan have been discussed with resident, above note edited where appropriate. Medically stable for discharge on PO antibiotics per OMFS. OMFS follow up in 1 week.   Mis Guaman MD I have personally seen, examined, and participated in the care of this patient, I have reviewed all pertinent clinical information, including history, physical exam, plan, and the above resident's note. The case and plan have been discussed with resident, above note edited where appropriate. Medically stable for discharge on PO antibiotics per OMFS. OMFS follow up in 1 week. Discharge planning time 35 minutes.   Mis Guaman MD

## 2020-01-10 NOTE — PROGRESS NOTE ADULT - REASON FOR ADMISSION
Submandibular Space Infection
Submandibular Space Infection
Tooth Abscess

## 2020-01-10 NOTE — PROGRESS NOTE ADULT - SUBJECTIVE AND OBJECTIVE BOX
She Méndez, PGY-1  Internal Medicine  Pager: [phone] (NS)/ [phone] (NANDO)    PROGRESS NOTE:     Patient is a 49y old  Female who presents with a chief complaint of Submandibular Space Infection (09 Jan 2020 09:45)    SUBJECTIVE / OVERNIGHT EVENTS:    Pt seen and examined at bedside this AM. Denies any dysphagia, dyspnea, CP, abd pain, N/V. Drain in left submandibular space w/ overlying dressing and minimal output.     REVIEW OF SYSTEMS:    CONSTITUTIONAL: No weakness, fevers or chills  EYES/ENT: No visual changes;  No vertigo or throat pain   NECK:   RESPIRATORY: No cough, wheezing, hemoptysis; No shortness of breath  CARDIOVASCULAR: No chest pain or palpitations  GASTROINTESTINAL: No abdominal or epigastric pain. No nausea, vomiting, or hematemesis; No diarrhea or constipation. No melena or hematochezia.  GENITOURINARY: No dysuria, frequency or hematuria  NEUROLOGICAL: No numbness or weakness  All other review of systems is negative unless indicated above.    MEDICATIONS  (STANDING):  chlorhexidine 0.12% Liquid 15 milliLiter(s) Swish and Spit two times a day  meropenem  IVPB 1000 milliGRAM(s) IV Intermittent every 8 hours    MEDICATIONS  (PRN):  acetaminophen   Tablet .. 650 milliGRAM(s) Oral every 4 hours PRN Mild Pain (1 - 3)    I&O's Summary  09 Jan 2020 07:01  -  10 Ruben 2020 07:00  --------------------------------------------------------  IN: 1000 mL / OUT: 0 mL / NET: 1000 mL    PHYSICAL EXAM:  Vital Signs Last 24 Hrs  T(C): 36.7 (10 Ruben 2020 06:07), Max: 37.4 (09 Jan 2020 22:05)  T(F): 98 (10 Ruben 2020 06:07), Max: 99.3 (09 Jan 2020 22:05)  HR: 65 (10 Ruben 2020 06:07) (65 - 83)  BP: 124/75 (10 Ruben 2020 06:07) (92/52 - 124/75)  BP(mean): --  RR: 18 (10 Ruben 2020 06:07) (16 - 18)  SpO2: 100% (10 Ruben 2020 06:07) (96% - 100%)    GENERAL: NAD, lying in bed comfortably  HEAD:  Atraumatic, Normocephalic  EYES: EOMI, conjunctiva and sclera clear  ENT: MMM  NECK: Dressing over L submandible. No warmth or tenderness around site of procedure. Minimal output  CHEST/LUNG: Clear to auscultation bilaterally; No rales, rhonchi, wheezing, or rubs. Unlabored respirations  HEART: Regular rate and rhythm; No murmurs, rubs, or gallops  ABDOMEN: Bowel sounds present; Soft, Nontender, Nondistended. No hepatomegaly  EXTREMITIES:  2+ Peripheral Pulses, brisk capillary refill. No clubbing, cyanosis, or edema  NERVOUS SYSTEM:  Alert & Oriented X3, speech clear. No deficits   MSK: FROM all 4 extremities, full and equal strength  SKIN: No rashes or lesions    LABS:             Culture - Surg Site Aerob/Anaer w/Gm St (01.08.20 @ 20:45)    Gram Stain Wound:   WBC^White Blood Cells  QNTY CELLS IN GRAM STAIN: FEW (2+)  GPC^Gram pos. cocci    Specimen Source: OTHER    IMAGING:     CT Neck Soft Tissue w/ IV Cont (01.07.20 @ 20:10)  IMPRESSION:   Periapical lucency involving the left mandibular second molar tooth with dehiscence of the lingual cortex. There is an adjacent 0.5 x 1.5 x 1.2 cm abscess within the sublingual space.    Xray Chest 1 View- PORTABLE-Urgent (01.08.20 @ 09:03)  IMPRESSION:  Clear lungs. She Méndez, PGY-1  Internal Medicine  Pager: [phone] (NS)/ [phone] (NANDO)    PROGRESS NOTE:     Patient is a 49y old  Female who presents with a chief complaint of Submandibular Space Infection (09 Jan 2020 09:45)    SUBJECTIVE / OVERNIGHT EVENTS:    Pt seen and examined at bedside this AM. Denies any dysphagia, dyspnea, CP, abd pain, N/V. Drain in left submandibular space w/ overlying dressing and minimal output.     REVIEW OF SYSTEMS:    CONSTITUTIONAL: No weakness, fevers or chills  EYES/ENT: No visual changes;  No vertigo or throat pain   NECK:   RESPIRATORY: No cough, wheezing, hemoptysis; No shortness of breath  CARDIOVASCULAR: No chest pain or palpitations  GASTROINTESTINAL: No abdominal or epigastric pain. No nausea, vomiting, or hematemesis; No diarrhea or constipation. No melena or hematochezia.  GENITOURINARY: No dysuria, frequency or hematuria  NEUROLOGICAL: No numbness or weakness  All other review of systems is negative unless indicated above.    MEDICATIONS  (STANDING):  chlorhexidine 0.12% Liquid 15 milliLiter(s) Swish and Spit two times a day  meropenem  IVPB 1000 milliGRAM(s) IV Intermittent every 8 hours    MEDICATIONS  (PRN):  acetaminophen   Tablet .. 650 milliGRAM(s) Oral every 4 hours PRN Mild Pain (1 - 3)    I&O's Summary  09 Jan 2020 07:01  -  10 Ruben 2020 07:00  --------------------------------------------------------  IN: 1000 mL / OUT: 0 mL / NET: 1000 mL    PHYSICAL EXAM:  Vital Signs Last 24 Hrs  T(C): 36.7 (10 Ruben 2020 06:07), Max: 37.4 (09 Jan 2020 22:05)  T(F): 98 (10 Ruben 2020 06:07), Max: 99.3 (09 Jan 2020 22:05)  HR: 65 (10 Ruben 2020 06:07) (65 - 83)  BP: 124/75 (10 Ruben 2020 06:07) (92/52 - 124/75)  BP(mean): --  RR: 18 (10 Ruben 2020 06:07) (16 - 18)  SpO2: 100% (10 Ruben 2020 06:07) (96% - 100%)    GENERAL: NAD, lying in bed comfortably  HEAD:  Atraumatic, Normocephalic  EYES: EOMI, conjunctiva and sclera clear  ENT: MMM  NECK: Dressing over L submandible. No warmth or tenderness around site of procedure. Minimal output on drain  CHEST/LUNG: Clear to auscultation bilaterally; No rales, rhonchi, wheezing, or rubs. Unlabored respirations  HEART: Regular rate and rhythm; No murmurs, rubs, or gallops  ABDOMEN: Bowel sounds present; Soft, Nontender, Nondistended. No hepatomegaly  EXTREMITIES:  2+ Peripheral Pulses, brisk capillary refill. No clubbing, cyanosis, or edema  NERVOUS SYSTEM:  Alert & Oriented X3, speech clear. No deficits   MSK: FROM all 4 extremities, full and equal strength  SKIN: No rashes or lesions    LABS:             No labs this AM    Culture - Surg Site Aerob/Anaer w/Gm St (01.08.20 @ 20:45)    Gram Stain Wound:   WBC^White Blood Cells  QNTY CELLS IN GRAM STAIN: FEW (2+)  GPC^Gram pos. cocci    Specimen Source: OTHER    IMAGING:     CT Neck Soft Tissue w/ IV Cont (01.07.20 @ 20:10)  IMPRESSION:   Periapical lucency involving the left mandibular second molar tooth with dehiscence of the lingual cortex. There is an adjacent 0.5 x 1.5 x 1.2 cm abscess within the sublingual space.    Xray Chest 1 View- PORTABLE-Urgent (01.08.20 @ 09:03)  IMPRESSION:  Clear lungs.

## 2020-01-11 LAB — CULTURE - SURGICAL SITE: SIGNIFICANT CHANGE UP

## 2020-01-14 LAB — SPECIMEN SOURCE: SIGNIFICANT CHANGE UP

## 2020-02-05 LAB — FUNGUS SPEC QL CULT: SIGNIFICANT CHANGE UP

## 2020-11-23 ENCOUNTER — APPOINTMENT (OUTPATIENT)
Dept: OTOLARYNGOLOGY | Facility: CLINIC | Age: 50
End: 2020-11-23

## 2021-07-28 ENCOUNTER — RESULT REVIEW (OUTPATIENT)
Age: 51
End: 2021-07-28

## 2021-08-05 PROBLEM — Z78.9 OTHER SPECIFIED HEALTH STATUS: Chronic | Status: ACTIVE | Noted: 2020-01-08

## 2021-08-17 ENCOUNTER — APPOINTMENT (OUTPATIENT)
Dept: MAMMOGRAPHY | Facility: IMAGING CENTER | Age: 51
End: 2021-08-17
Payer: COMMERCIAL

## 2021-08-17 ENCOUNTER — TRANSCRIPTION ENCOUNTER (OUTPATIENT)
Age: 51
End: 2021-08-17

## 2021-08-17 ENCOUNTER — APPOINTMENT (OUTPATIENT)
Dept: ULTRASOUND IMAGING | Facility: IMAGING CENTER | Age: 51
End: 2021-08-17
Payer: COMMERCIAL

## 2021-08-17 ENCOUNTER — OUTPATIENT (OUTPATIENT)
Dept: OUTPATIENT SERVICES | Facility: HOSPITAL | Age: 51
LOS: 1 days | End: 2021-08-17
Payer: COMMERCIAL

## 2021-08-17 DIAGNOSIS — Z00.8 ENCOUNTER FOR OTHER GENERAL EXAMINATION: ICD-10-CM

## 2021-08-17 DIAGNOSIS — D25.9 LEIOMYOMA OF UTERUS, UNSPECIFIED: ICD-10-CM

## 2021-08-17 PROCEDURE — 76830 TRANSVAGINAL US NON-OB: CPT | Mod: 26

## 2021-08-17 PROCEDURE — 76856 US EXAM PELVIC COMPLETE: CPT | Mod: 26,59

## 2021-08-17 PROCEDURE — 76641 ULTRASOUND BREAST COMPLETE: CPT | Mod: 26,50

## 2021-08-17 PROCEDURE — 77067 SCR MAMMO BI INCL CAD: CPT | Mod: 26

## 2021-08-17 PROCEDURE — 77063 BREAST TOMOSYNTHESIS BI: CPT | Mod: 26

## 2021-08-17 PROCEDURE — 76641 ULTRASOUND BREAST COMPLETE: CPT

## 2021-08-17 PROCEDURE — 76856 US EXAM PELVIC COMPLETE: CPT

## 2021-08-17 PROCEDURE — 77063 BREAST TOMOSYNTHESIS BI: CPT

## 2021-08-17 PROCEDURE — 77067 SCR MAMMO BI INCL CAD: CPT

## 2021-08-17 PROCEDURE — 76830 TRANSVAGINAL US NON-OB: CPT

## 2022-02-21 NOTE — ED ADULT NURSE NOTE - OBJECTIVE STATEMENT
Ambulatory from home complaining of L mandibular pain status post root canal 5 days ago. Patient had procedure done at the endodontist and went back to see him because she had increased pain. DDS instructed her to come to the ED because she has an infection that would require IV ABx. Ambulatory, gait steady, able to tolerate secretions. VSS. Afebrile. 20g PIV inserted by day shift RN, labs drawn and sent, medicated as ordered. Safety maintained, needs attended, will continue to monitor.
21-Feb-2022 20:16

## 2022-10-27 NOTE — ED PROVIDER NOTE - NS ED MD DISPO ADMITTING SERVICE
MED Bi-Rhombic Flap Text: The defect edges were debeveled with a #15 scalpel blade.  Given the location of the defect and the proximity to free margins a bi-rhombic flap was deemed most appropriate.  Using a sterile surgical marker, an appropriate rhombic flap was drawn incorporating the defect. The area thus outlined was incised deep to adipose tissue with a #15 scalpel blade.  The skin margins were undermined to an appropriate distance in all directions utilizing iris scissors.

## 2023-01-18 ENCOUNTER — OFFICE (OUTPATIENT)
Dept: URBAN - METROPOLITAN AREA CLINIC 90 | Facility: CLINIC | Age: 53
Setting detail: OPHTHALMOLOGY
End: 2023-01-18
Payer: COMMERCIAL

## 2023-01-18 DIAGNOSIS — H16.223: ICD-10-CM

## 2023-01-18 DIAGNOSIS — H40.013: ICD-10-CM

## 2023-01-18 DIAGNOSIS — H25.13: ICD-10-CM

## 2023-01-18 PROCEDURE — 92083 EXTENDED VISUAL FIELD XM: CPT | Performed by: OPHTHALMOLOGY

## 2023-01-18 PROCEDURE — 92133 CPTRZD OPH DX IMG PST SGM ON: CPT | Performed by: OPHTHALMOLOGY

## 2023-01-18 PROCEDURE — 92014 COMPRE OPH EXAM EST PT 1/>: CPT | Performed by: OPHTHALMOLOGY

## 2023-01-18 ASSESSMENT — SPHEQUIV_DERIVED
OD_SPHEQUIV: -0.5
OS_SPHEQUIV: 0.75
OS_SPHEQUIV: 0
OD_SPHEQUIV: -0.875
OD_SPHEQUIV: -0.75

## 2023-01-18 ASSESSMENT — REFRACTION_MANIFEST
OD_AXIS: 015
OS_CYLINDER: -0.50
OS_SPHERE: PLANO
OD_AXIS: 010
OS_VA1: 20/20
OS_CYLINDER: -0.50
OS_VA1: 20/20-2
OD_VA1: 20/20-2
OS_VA2: 20/20(J1+)
OS_SPHERE: PLANO
OD_CYLINDER: SPHERE
OS_VA1: 20/25+2
OD_CYLINDER: -0.75
OD_ADD: +2.00
OS_ADD: +1.75
OD_VA1: 20/30-1
OD_SPHERE: -0.50
OD_SPHERE: -0.25
OD_SPHERE: PLANO
OS_ADD: +2.00
OS_ADD: +1.75
OD_VA1: 20/20-
OD_ADD: +1.75
OD_CYLINDER: -1.00
OS_AXIS: 150
OS_AXIS: 175
OD_ADD: +1.75
OS_CYLINDER: SPHERE
OU_VA: 20/20
OS_SPHERE: +1.00
OD_VA2: 20/20(J1+)

## 2023-01-18 ASSESSMENT — REFRACTION_AUTOREFRACTION
OS_AXIS: 170
OD_AXIS: 018
OS_SPHERE: +0.25
OS_CYLINDER: -0.50
OD_CYLINDER: -1.00
OD_SPHERE: 0.00

## 2023-01-18 ASSESSMENT — VISUAL ACUITY
OD_BCVA: 20/25
OS_BCVA: 20/25-2

## 2023-01-18 ASSESSMENT — CONFRONTATIONAL VISUAL FIELD TEST (CVF)
OS_FINDINGS: FULL
OD_FINDINGS: FULL

## 2023-01-18 ASSESSMENT — AXIALLENGTH_DERIVED
OS_AL: 24.0906
OS_AL: 24.3991
OD_AL: 24.5188
OD_AL: 24.4663
OD_AL: 24.362

## 2023-01-18 ASSESSMENT — PACHYMETRY
OD_CT_CORRECTION: 6
OS_CT_CORRECTION: 6
OD_CT_UM: 450
OS_CT_UM: 450

## 2023-01-18 ASSESSMENT — KERATOMETRY
OS_K2POWER_DIOPTERS: 41.25
OD_AXISANGLE_DEGREES: 107
METHOD_AUTO_MANUAL: AUTO
OD_K1POWER_DIOPTERS: 41.75
OS_K1POWER_DIOPTERS: 41.50
OS_AXISANGLE_DEGREES: 097
OD_K2POWER_DIOPTERS: 42.25

## 2023-01-18 ASSESSMENT — TONOMETRY
OS_IOP_MMHG: 14
OD_IOP_MMHG: 14

## 2023-01-18 ASSESSMENT — SUPERFICIAL PUNCTATE KERATITIS (SPK)
OS_SPK: T 1+
OD_SPK: 1+

## 2023-03-10 ENCOUNTER — APPOINTMENT (OUTPATIENT)
Dept: INTERNAL MEDICINE | Facility: CLINIC | Age: 53
End: 2023-03-10
Payer: COMMERCIAL

## 2023-03-10 ENCOUNTER — NON-APPOINTMENT (OUTPATIENT)
Age: 53
End: 2023-03-10

## 2023-03-10 ENCOUNTER — LABORATORY RESULT (OUTPATIENT)
Age: 53
End: 2023-03-10

## 2023-03-10 VITALS
WEIGHT: 174 LBS | HEART RATE: 83 BPM | DIASTOLIC BLOOD PRESSURE: 64 MMHG | BODY MASS INDEX: 29.71 KG/M2 | SYSTOLIC BLOOD PRESSURE: 100 MMHG | TEMPERATURE: 97.6 F | HEIGHT: 64 IN | OXYGEN SATURATION: 96 %

## 2023-03-10 DIAGNOSIS — Z80.0 FAMILY HISTORY OF MALIGNANT NEOPLASM OF DIGESTIVE ORGANS: ICD-10-CM

## 2023-03-10 DIAGNOSIS — Z00.00 ENCOUNTER FOR GENERAL ADULT MEDICAL EXAMINATION W/OUT ABNORMAL FINDINGS: ICD-10-CM

## 2023-03-10 DIAGNOSIS — Z13.220 ENCOUNTER FOR SCREENING FOR LIPOID DISORDERS: ICD-10-CM

## 2023-03-10 DIAGNOSIS — F17.200 NICOTINE DEPENDENCE, UNSPECIFIED, UNCOMPLICATED: ICD-10-CM

## 2023-03-10 DIAGNOSIS — Z83.49 FAMILY HISTORY OF OTHER ENDOCRINE, NUTRITIONAL AND METABOLIC DISEASES: ICD-10-CM

## 2023-03-10 DIAGNOSIS — U07.1 COVID-19: ICD-10-CM

## 2023-03-10 DIAGNOSIS — Z82.61 FAMILY HISTORY OF ARTHRITIS: ICD-10-CM

## 2023-03-10 PROCEDURE — G0444 DEPRESSION SCREEN ANNUAL: CPT | Mod: 59

## 2023-03-10 PROCEDURE — 99386 PREV VISIT NEW AGE 40-64: CPT | Mod: 25

## 2023-03-10 PROCEDURE — 93000 ELECTROCARDIOGRAM COMPLETE: CPT | Mod: 59

## 2023-03-10 NOTE — HEALTH RISK ASSESSMENT
[No] : In the past 12 months have you used drugs other than those required for medical reasons? No [PHQ-2 Negative - No further assessment needed] : PHQ-2 Negative - No further assessment needed [None] : None [With Significant Other] : lives with significant other [With Family] : lives with family [Employed] : employed [Significant Other] : lives with significant other [Current] : Current [10-14] : 10-14

## 2023-03-12 NOTE — ASSESSMENT
[FreeTextEntry1] : discussed w pt \par \par reviewed past hx \par \par reviewed Menieres disease, she is using hearing aides intermittently, she will consider ENT f/u this year \par \par check routine labs as below \par \par advised on diet control, exercise, weight control, monitor lipids \par \par advised on smoking cessation ,she is aware of risks, discussed few strategies. she feels she will be ready when she retires in coming year \par \par cont GYN screening yearly \par she will scheduled mammography \par \par discussed first screening colonoscopy, referred to GI , she plans to make appt \par \par vaccinations reviewed,. she will consider Shingrix vaccine in the future \par \par RTO 6 months for routine f/u or earlier prn if any new concerns \par \par

## 2023-03-12 NOTE — HISTORY OF PRESENT ILLNESS
[de-identified] : 51 y/o woman presents for initial visit to establish primary care with internal medicine, for routine physical exam. she has not had routine exams or lab testing in last few years, not following w a PMD. \par \par hx of Meniere's disease with haring loss, wears hearing aides intermittently, previous evaluations w ENT. \par \par no other chronic medical issues to her knowledge \par \par not taking any rx \par \par she plans to schedule first screening colonoscopy shortly \par \par chronic smoking for >20 years, has attempted to cut down in the past , plans to restart efforts when she retires later this year \par \par she reports she is UTD w GYN screening and due for mammography, no abnormalities to her knowledge

## 2023-03-12 NOTE — REVIEW OF SYSTEMS
[Patient Intake Form Reviewed] : Patient intake form was reviewed [Hearing Loss] : hearing loss [Negative] : Heme/Lymph [Hoarseness] : no hoarseness [Earache] : no earache

## 2023-03-13 NOTE — PATIENT PROFILE ADULT - NSPROMEDSHERBAL_GEN_A_NUR
Med Requested: clonazePAM (KlonoPIN) 0.5 MG tablet    Last visit: 11/29/2022  Recommended Follow Up: 16 weeks  No Show/Cancel: NONE  Next visit: 3/22/2023     Controlled Med - Routed to Provider due to NO PROTOCOL. Orders have been prepped according to providers note.     Ordered date: 11/29/2022 for 30 day supply w/3 RF-fill on 12/01/2022  Last RX dispensed (per PDMP): 02/21/2023        
no

## 2023-05-10 LAB
25(OH)D3 SERPL-MCNC: 31.1 NG/ML
ALBUMIN SERPL ELPH-MCNC: 4.7 G/DL
ALP BLD-CCNC: 63 U/L
ALT SERPL-CCNC: 20 U/L
ANION GAP SERPL CALC-SCNC: 13 MMOL/L
APPEARANCE: CLEAR
AST SERPL-CCNC: 21 U/L
BASOPHILS # BLD AUTO: 0.04 K/UL
BASOPHILS NFR BLD AUTO: 0.7 %
BILIRUB SERPL-MCNC: 0.5 MG/DL
BILIRUBIN URINE: NEGATIVE
BLOOD URINE: NEGATIVE
BUN SERPL-MCNC: 16 MG/DL
CALCIUM SERPL-MCNC: 9.7 MG/DL
CHLORIDE SERPL-SCNC: 102 MMOL/L
CHOLEST SERPL-MCNC: 227 MG/DL
CO2 SERPL-SCNC: 26 MMOL/L
COLOR: NORMAL
CREAT SERPL-MCNC: 0.71 MG/DL
EGFR: 101 ML/MIN/1.73M2
EOSINOPHIL # BLD AUTO: 0.15 K/UL
EOSINOPHIL NFR BLD AUTO: 2.8 %
ESTIMATED AVERAGE GLUCOSE: 108 MG/DL
GLUCOSE QUALITATIVE U: NEGATIVE
GLUCOSE SERPL-MCNC: 87 MG/DL
HBA1C MFR BLD HPLC: 5.4 %
HCT VFR BLD CALC: 44.8 %
HDLC SERPL-MCNC: 46 MG/DL
HGB BLD-MCNC: 14.5 G/DL
IMM GRANULOCYTES NFR BLD AUTO: 0.2 %
KETONES URINE: NEGATIVE
LDLC SERPL CALC-MCNC: 132 MG/DL
LEUKOCYTE ESTERASE URINE: ABNORMAL
LYMPHOCYTES # BLD AUTO: 2.39 K/UL
LYMPHOCYTES NFR BLD AUTO: 44.1 %
MAN DIFF?: NORMAL
MCHC RBC-ENTMCNC: 30.4 PG
MCHC RBC-ENTMCNC: 32.4 GM/DL
MCV RBC AUTO: 93.9 FL
MONOCYTES # BLD AUTO: 0.36 K/UL
MONOCYTES NFR BLD AUTO: 6.6 %
NEUTROPHILS # BLD AUTO: 2.47 K/UL
NEUTROPHILS NFR BLD AUTO: 45.6 %
NITRITE URINE: NEGATIVE
NONHDLC SERPL-MCNC: 181 MG/DL
PH URINE: 7
PLATELET # BLD AUTO: 285 K/UL
POTASSIUM SERPL-SCNC: 4.4 MMOL/L
PROT SERPL-MCNC: 7.1 G/DL
PROTEIN URINE: NEGATIVE
RBC # BLD: 4.77 M/UL
RBC # FLD: 12.3 %
SODIUM SERPL-SCNC: 140 MMOL/L
SPECIFIC GRAVITY URINE: 1.01
T4 FREE SERPL-MCNC: 1.2 NG/DL
TRIGL SERPL-MCNC: 244 MG/DL
TSH SERPL-ACNC: 3.19 UIU/ML
UROBILINOGEN URINE: NORMAL
WBC # FLD AUTO: 5.42 K/UL

## 2023-06-07 NOTE — PRE-OP CHECKLIST - WARM FLUIDS/WARM BLANKETS
Refill Request (has 2 week supply left)    Simvastatin *Neurologist recommends that this gets increased from 10 mg to 20 mg - due to her LDL being 112     Levothyroxine     Pharmacy:     [facility]: 3/8/23  [facility]: 6/14/23 no warm blanket given/yes

## 2023-11-09 ENCOUNTER — APPOINTMENT (OUTPATIENT)
Dept: OTOLARYNGOLOGY | Facility: CLINIC | Age: 53
End: 2023-11-09
Payer: COMMERCIAL

## 2023-11-09 VITALS
WEIGHT: 180 LBS | HEIGHT: 64 IN | DIASTOLIC BLOOD PRESSURE: 79 MMHG | BODY MASS INDEX: 30.73 KG/M2 | SYSTOLIC BLOOD PRESSURE: 125 MMHG | HEART RATE: 84 BPM

## 2023-11-09 PROCEDURE — 99203 OFFICE O/P NEW LOW 30 MIN: CPT

## 2023-11-09 PROCEDURE — 92567 TYMPANOMETRY: CPT

## 2023-11-09 PROCEDURE — 92557 COMPREHENSIVE HEARING TEST: CPT

## 2024-01-22 ENCOUNTER — APPOINTMENT (OUTPATIENT)
Dept: OTOLARYNGOLOGY | Facility: CLINIC | Age: 54
End: 2024-01-22
Payer: COMMERCIAL

## 2024-01-22 ENCOUNTER — APPOINTMENT (OUTPATIENT)
Dept: PHARMACY | Facility: CLINIC | Age: 54
End: 2024-01-22
Payer: SELF-PAY

## 2024-01-22 VITALS
BODY MASS INDEX: 30.73 KG/M2 | HEIGHT: 64 IN | DIASTOLIC BLOOD PRESSURE: 68 MMHG | SYSTOLIC BLOOD PRESSURE: 110 MMHG | WEIGHT: 180 LBS

## 2024-01-22 DIAGNOSIS — H81.01 MENIERE'S DISEASE, RIGHT EAR: ICD-10-CM

## 2024-01-22 DIAGNOSIS — H90.5 UNSPECIFIED SENSORINEURAL HEARING LOSS: ICD-10-CM

## 2024-01-22 PROCEDURE — 99213 OFFICE O/P EST LOW 20 MIN: CPT

## 2024-01-22 PROCEDURE — V5010 ASSESSMENT FOR HEARING AID: CPT | Mod: NC

## 2024-01-22 RX ORDER — TRIAMTERENE AND HYDROCHLOROTHIAZIDE 25; 37.5 MG/1; MG/1
37.5-25 TABLET ORAL DAILY
Qty: 90 | Refills: 3 | Status: DISCONTINUED | COMMUNITY
Start: 2023-11-09 | End: 2024-01-22

## 2024-01-22 NOTE — HISTORY OF PRESENT ILLNESS
[FreeTextEntry1] : 53 year old female seen for a hearing aid consultation.   Pt was referred by Dr. Monterroso.   Audio evaluation reveals hearing WNL in the left ear and a mild sloping to severe SNHL in the  right ear.   Pt reports she has worn a hearing aid in the right ear for many years but finds it is not working well now.   Current hearing aid is a Widex Unique 220 Fusion.  Pt reports she has difficulty hearing well in noisy environments and is now a very part-time hearing aid user.  Etiology of hearing loss in right ear is Meniere's diseases, has vertigo occasionally and tinnitus.   She reports her tinnitus is present but not as loud while using her current hearing aid.  Is a retired  and now works at [employer].  No intolerance to louder sounds reported.

## 2024-01-22 NOTE — HISTORY OF PRESENT ILLNESS
[de-identified] : 52 yo F with unilateral SNHL and Menieres disease presents for follow up. Was unable to tolerate Dyazide - decreased BP and dizziness. Symptoms resolved after stopping medication. No change in hearing since last visit. No vertigo attacks. Continues to have constant tinnitus AD. No otalgia, otorrhea, ear infections, dizziness or headaches related to hearing. Going to get Hearing Aid adjusted today

## 2024-01-22 NOTE — PROCEDURE
[de-identified] : A demonstration of hearing aid technology was completed using both Widex and Oticon demo models.   Reviewed HA styles and technology levels.   Education provided re: adjusting to the use of new amplification and recommended use of NR.  Pt tinnitus was eliminated using Oticon Real; diminished with Widex Moment.    Reviewed all options and pt ordered Oticon Real 2 MR-R, 2 85 , 8mm dv base dome, color 93.

## 2024-03-12 ENCOUNTER — APPOINTMENT (OUTPATIENT)
Dept: PHARMACY | Facility: CLINIC | Age: 54
End: 2024-03-12
Payer: SELF-PAY

## 2024-03-12 PROCEDURE — V5257C: CUSTOM | Mod: RT

## 2024-03-14 NOTE — HISTORY OF PRESENT ILLNESS
[FreeTextEntry1] :   Pt fit with:  Ears Fit:      right OLIVO model:   Oticon Real 2 MR-R RSN:           [id number]  SN: [id number] Receivers:   2 85 Domes/Molds: 8mm dv base Repair & L/D warranties: 4/12/2027

## 2024-03-14 NOTE — PROCEDURE
[de-identified] : Initially set gain to ACC  2 and lowered it to 1 for patient comfort re: sound of her own voice.   Adaptation manager set to increase to full over next 3 weeks.   Education provided re: need to lower HA temporarily for adjustment.   Hearing aid was paired to pt iphone.   Turned off BT streaming for call/media.  Pt shown how to turn back on if needed.  Reviewed use of [email or web address]  gloria.  Pt expressed good understanding of all concepts.

## 2024-03-25 ENCOUNTER — APPOINTMENT (OUTPATIENT)
Dept: PHARMACY | Facility: CLINIC | Age: 54
End: 2024-03-25
Payer: SELF-PAY

## 2024-03-25 PROCEDURE — V5299A: CUSTOM

## 2025-01-27 ENCOUNTER — APPOINTMENT (OUTPATIENT)
Dept: PHARMACY | Facility: CLINIC | Age: 55
End: 2025-01-27
Payer: SELF-PAY

## 2025-01-27 ENCOUNTER — APPOINTMENT (OUTPATIENT)
Dept: OTOLARYNGOLOGY | Facility: CLINIC | Age: 55
End: 2025-01-27

## 2025-01-27 ENCOUNTER — NON-APPOINTMENT (OUTPATIENT)
Age: 55
End: 2025-01-27

## 2025-01-27 PROCEDURE — 99213 OFFICE O/P EST LOW 20 MIN: CPT

## 2025-01-27 PROCEDURE — V5299A: CUSTOM

## 2025-01-27 PROCEDURE — 92557 COMPREHENSIVE HEARING TEST: CPT

## 2025-01-27 PROCEDURE — 92567 TYMPANOMETRY: CPT

## 2025-03-26 ENCOUNTER — APPOINTMENT (OUTPATIENT)
Dept: INTERNAL MEDICINE | Facility: CLINIC | Age: 55
End: 2025-03-26
Payer: COMMERCIAL

## 2025-03-26 VITALS
HEIGHT: 64 IN | OXYGEN SATURATION: 99 % | WEIGHT: 170 LBS | HEART RATE: 64 BPM | DIASTOLIC BLOOD PRESSURE: 64 MMHG | SYSTOLIC BLOOD PRESSURE: 92 MMHG | TEMPERATURE: 98.1 F | BODY MASS INDEX: 29.02 KG/M2

## 2025-03-26 DIAGNOSIS — Z12.39 ENCOUNTER FOR OTHER SCREENING FOR MALIGNANT NEOPLASM OF BREAST: ICD-10-CM

## 2025-03-26 DIAGNOSIS — Z23 ENCOUNTER FOR IMMUNIZATION: ICD-10-CM

## 2025-03-26 DIAGNOSIS — Z00.00 ENCOUNTER FOR GENERAL ADULT MEDICAL EXAMINATION W/OUT ABNORMAL FINDINGS: ICD-10-CM

## 2025-03-26 DIAGNOSIS — F17.200 NICOTINE DEPENDENCE, UNSPECIFIED, UNCOMPLICATED: ICD-10-CM

## 2025-03-26 DIAGNOSIS — Z12.11 ENCOUNTER FOR SCREENING FOR MALIGNANT NEOPLASM OF COLON: ICD-10-CM

## 2025-03-26 DIAGNOSIS — H81.01 MENIERE'S DISEASE, RIGHT EAR: ICD-10-CM

## 2025-03-26 DIAGNOSIS — Z13.220 ENCOUNTER FOR SCREENING FOR LIPOID DISORDERS: ICD-10-CM

## 2025-03-26 PROCEDURE — 90471 IMMUNIZATION ADMIN: CPT

## 2025-03-26 PROCEDURE — 90715 TDAP VACCINE 7 YRS/> IM: CPT

## 2025-03-26 PROCEDURE — 99396 PREV VISIT EST AGE 40-64: CPT | Mod: 25

## 2025-03-27 LAB
ALBUMIN SERPL ELPH-MCNC: 4.4 G/DL
ALP BLD-CCNC: 68 U/L
ALT SERPL-CCNC: 19 U/L
ANION GAP SERPL CALC-SCNC: 11 MMOL/L
AST SERPL-CCNC: 18 U/L
BASOPHILS # BLD AUTO: 0.06 K/UL
BASOPHILS NFR BLD AUTO: 1 %
BILIRUB SERPL-MCNC: 0.5 MG/DL
BUN SERPL-MCNC: 14 MG/DL
CALCIUM SERPL-MCNC: 9.8 MG/DL
CHLORIDE SERPL-SCNC: 106 MMOL/L
CHOLEST SERPL-MCNC: 230 MG/DL
CO2 SERPL-SCNC: 25 MMOL/L
CREAT SERPL-MCNC: 0.73 MG/DL
EGFRCR SERPLBLD CKD-EPI 2021: 96 ML/MIN/1.73M2
EOSINOPHIL # BLD AUTO: 0.18 K/UL
EOSINOPHIL NFR BLD AUTO: 3.1 %
ESTIMATED AVERAGE GLUCOSE: 108 MG/DL
GLUCOSE SERPL-MCNC: 100 MG/DL
HBA1C MFR BLD HPLC: 5.4 %
HCT VFR BLD CALC: 43.1 %
HDLC SERPL-MCNC: 44 MG/DL
HGB BLD-MCNC: 14.1 G/DL
IMM GRANULOCYTES NFR BLD AUTO: 0.2 %
LDLC SERPL-MCNC: 136 MG/DL
LYMPHOCYTES # BLD AUTO: 2.2 K/UL
LYMPHOCYTES NFR BLD AUTO: 38.5 %
MAN DIFF?: NORMAL
MCHC RBC-ENTMCNC: 31.1 PG
MCHC RBC-ENTMCNC: 32.7 G/DL
MCV RBC AUTO: 94.9 FL
MONOCYTES # BLD AUTO: 0.41 K/UL
MONOCYTES NFR BLD AUTO: 7.2 %
NEUTROPHILS # BLD AUTO: 2.86 K/UL
NEUTROPHILS NFR BLD AUTO: 50 %
NONHDLC SERPL-MCNC: 185 MG/DL
PLATELET # BLD AUTO: 271 K/UL
POTASSIUM SERPL-SCNC: 4.8 MMOL/L
PROT SERPL-MCNC: 7.2 G/DL
RBC # BLD: 4.54 M/UL
RBC # FLD: 12.3 %
SODIUM SERPL-SCNC: 141 MMOL/L
TRIGL SERPL-MCNC: 273 MG/DL
TSH SERPL-ACNC: 2.62 UIU/ML
WBC # FLD AUTO: 5.72 K/UL

## 2025-08-04 ENCOUNTER — APPOINTMENT (OUTPATIENT)
Dept: CT IMAGING | Facility: IMAGING CENTER | Age: 55
End: 2025-08-04
Payer: COMMERCIAL

## 2025-08-04 ENCOUNTER — NON-APPOINTMENT (OUTPATIENT)
Age: 55
End: 2025-08-04

## 2025-08-04 ENCOUNTER — APPOINTMENT (OUTPATIENT)
Dept: MAMMOGRAPHY | Facility: IMAGING CENTER | Age: 55
End: 2025-08-04
Payer: COMMERCIAL

## 2025-08-04 ENCOUNTER — OUTPATIENT (OUTPATIENT)
Dept: OUTPATIENT SERVICES | Facility: HOSPITAL | Age: 55
LOS: 1 days | End: 2025-08-04
Payer: COMMERCIAL

## 2025-08-04 ENCOUNTER — RESULT REVIEW (OUTPATIENT)
Age: 55
End: 2025-08-04

## 2025-08-04 VITALS — WEIGHT: 170 LBS | BODY MASS INDEX: 29.02 KG/M2 | HEIGHT: 64 IN

## 2025-08-04 DIAGNOSIS — F17.200 NICOTINE DEPENDENCE, UNSPECIFIED, UNCOMPLICATED: ICD-10-CM

## 2025-08-04 DIAGNOSIS — Z00.8 ENCOUNTER FOR OTHER GENERAL EXAMINATION: ICD-10-CM

## 2025-08-04 PROCEDURE — 77067 SCR MAMMO BI INCL CAD: CPT | Mod: 26

## 2025-08-04 PROCEDURE — 71271 CT THORAX LUNG CANCER SCR C-: CPT

## 2025-08-04 PROCEDURE — 71271 CT THORAX LUNG CANCER SCR C-: CPT | Mod: 26

## 2025-08-04 PROCEDURE — 77067 SCR MAMMO BI INCL CAD: CPT

## 2025-08-04 PROCEDURE — 77063 BREAST TOMOSYNTHESIS BI: CPT | Mod: 26

## 2025-08-04 PROCEDURE — 77063 BREAST TOMOSYNTHESIS BI: CPT

## 2025-08-05 ENCOUNTER — TRANSCRIPTION ENCOUNTER (OUTPATIENT)
Age: 55
End: 2025-08-05

## 2025-08-08 ENCOUNTER — NON-APPOINTMENT (OUTPATIENT)
Age: 55
End: 2025-08-08

## 2025-08-09 ENCOUNTER — TRANSCRIPTION ENCOUNTER (OUTPATIENT)
Age: 55
End: 2025-08-09